# Patient Record
Sex: FEMALE | Race: WHITE | NOT HISPANIC OR LATINO | Employment: OTHER
[De-identification: names, ages, dates, MRNs, and addresses within clinical notes are randomized per-mention and may not be internally consistent; named-entity substitution may affect disease eponyms.]

---

## 2017-05-17 ENCOUNTER — RECORDS - HEALTHEAST (OUTPATIENT)
Dept: ADMINISTRATIVE | Facility: OTHER | Age: 27
End: 2017-05-17

## 2017-05-17 LAB — PAP SMEAR - HIM PATIENT REPORTED: NORMAL

## 2017-06-10 ENCOUNTER — HEALTH MAINTENANCE LETTER (OUTPATIENT)
Age: 27
End: 2017-06-10

## 2018-11-08 ENCOUNTER — TRANSFERRED RECORDS (OUTPATIENT)
Dept: HEALTH INFORMATION MANAGEMENT | Facility: CLINIC | Age: 28
End: 2018-11-08

## 2018-11-08 ENCOUNTER — COMMUNICATION - HEALTHEAST (OUTPATIENT)
Dept: TELEHEALTH | Facility: CLINIC | Age: 28
End: 2018-11-08

## 2018-11-08 ENCOUNTER — OFFICE VISIT - HEALTHEAST (OUTPATIENT)
Dept: FAMILY MEDICINE | Facility: CLINIC | Age: 28
End: 2018-11-08

## 2018-11-08 DIAGNOSIS — F41.9 ANXIETY: ICD-10-CM

## 2018-11-08 DIAGNOSIS — N64.4 BREAST PAIN, LEFT: ICD-10-CM

## 2018-11-08 DIAGNOSIS — R00.2 PALPITATIONS: ICD-10-CM

## 2018-11-08 DIAGNOSIS — N92.6 IRREGULAR MENSES: ICD-10-CM

## 2018-11-08 ASSESSMENT — MIFFLIN-ST. JEOR: SCORE: 1471.91

## 2018-11-14 ENCOUNTER — HOSPITAL ENCOUNTER (OUTPATIENT)
Dept: MAMMOGRAPHY | Facility: CLINIC | Age: 28
Discharge: HOME | End: 2018-11-14
Attending: NURSE PRACTITIONER

## 2018-11-14 ENCOUNTER — HOSPITAL ENCOUNTER (OUTPATIENT)
Dept: ULTRASOUND IMAGING | Facility: CLINIC | Age: 28
Discharge: HOME | End: 2018-11-14
Attending: NURSE PRACTITIONER

## 2018-11-14 DIAGNOSIS — N64.4 BREAST PAIN: ICD-10-CM

## 2018-11-14 DIAGNOSIS — N64.4 BREAST PAIN, LEFT: ICD-10-CM

## 2018-11-15 ENCOUNTER — AMBULATORY - HEALTHEAST (OUTPATIENT)
Dept: LAB | Facility: CLINIC | Age: 28
End: 2018-11-15

## 2018-11-15 ENCOUNTER — COMMUNICATION - HEALTHEAST (OUTPATIENT)
Dept: FAMILY MEDICINE | Facility: CLINIC | Age: 28
End: 2018-11-15

## 2018-11-15 DIAGNOSIS — N92.6 IRREGULAR MENSES: ICD-10-CM

## 2018-11-15 LAB
PROLACTIN SERPL-MCNC: 22.4 NG/ML (ref 0–20)
TSH SERPL DL<=0.005 MIU/L-ACNC: 1.21 UIU/ML (ref 0.3–5)

## 2018-11-19 ENCOUNTER — COMMUNICATION - HEALTHEAST (OUTPATIENT)
Dept: FAMILY MEDICINE | Facility: CLINIC | Age: 28
End: 2018-11-19

## 2018-11-20 ENCOUNTER — AMBULATORY - HEALTHEAST (OUTPATIENT)
Dept: FAMILY MEDICINE | Facility: CLINIC | Age: 28
End: 2018-11-20

## 2018-11-20 DIAGNOSIS — E22.1 HYPERPROLACTINEMIA (H): ICD-10-CM

## 2018-11-23 ENCOUNTER — TRANSFERRED RECORDS (OUTPATIENT)
Dept: HEALTH INFORMATION MANAGEMENT | Facility: CLINIC | Age: 28
End: 2018-11-23

## 2018-11-23 ENCOUNTER — AMBULATORY - HEALTHEAST (OUTPATIENT)
Dept: LAB | Facility: CLINIC | Age: 28
End: 2018-11-23

## 2018-11-23 DIAGNOSIS — E22.1 HYPERPROLACTINEMIA (H): ICD-10-CM

## 2018-11-23 LAB — PROLACTIN SERPL-MCNC: 26 NG/ML (ref 0–20)

## 2018-11-25 ENCOUNTER — COMMUNICATION - HEALTHEAST (OUTPATIENT)
Dept: FAMILY MEDICINE | Facility: CLINIC | Age: 28
End: 2018-11-25

## 2018-11-26 ENCOUNTER — HOSPITAL ENCOUNTER (OUTPATIENT)
Dept: CARDIOLOGY | Facility: CLINIC | Age: 28
Discharge: HOME | End: 2018-11-26
Attending: NURSE PRACTITIONER

## 2018-11-26 ENCOUNTER — AMBULATORY - HEALTHEAST (OUTPATIENT)
Dept: FAMILY MEDICINE | Facility: CLINIC | Age: 28
End: 2018-11-26

## 2018-11-26 DIAGNOSIS — R00.2 PALPITATIONS: ICD-10-CM

## 2018-11-26 LAB
AORTIC ROOT: 3 CM
BSA FOR ECHO PROCEDURE: 1.84 M2
CV BLOOD PRESSURE: ABNORMAL MMHG
CV ECHO HEIGHT: 68.3 IN
CV ECHO WEIGHT: 155 LBS
DOP CALC LVOT AREA: 3.46 CM2
DOP CALC LVOT DIAMETER: 2.1 CM
DOP CALC LVOT PEAK VEL: 111 CM/S
DOP CALC LVOT STROKE VOLUME: 75.1 CM3
DOP CALCLVOT PEAK VEL VTI: 21.7 CM
EJECTION FRACTION: 71 % (ref 55–75)
FRACTIONAL SHORTENING: 33.1 % (ref 28–44)
INTERVENTRICULAR SEPTUM IN END DIASTOLE: 0.8 CM (ref 0.6–0.9)
IVS/PW RATIO: 1
LA AREA 1: 16.5 CM2
LA AREA 2: 16.6 CM2
LEFT ATRIUM LENGTH: 5.28 CM
LEFT ATRIUM SIZE: 3.2 CM
LEFT ATRIUM TO AORTIC ROOT RATIO: 1.07 NO UNITS
LEFT ATRIUM VOLUME INDEX: 24 ML/M2
LEFT ATRIUM VOLUME: 44.1 ML
LEFT VENTRICLE CARDIAC INDEX: 3.1 L/MIN/M2
LEFT VENTRICLE CARDIAC OUTPUT: 5.8 L/MIN
LEFT VENTRICLE DIASTOLIC VOLUME INDEX: 89.1 CM3/M2 (ref 29–61)
LEFT VENTRICLE DIASTOLIC VOLUME: 164 CM3 (ref 46–106)
LEFT VENTRICLE HEART RATE: 77 BPM
LEFT VENTRICLE MASS INDEX: 59.6 G/M2
LEFT VENTRICLE SYSTOLIC VOLUME INDEX: 26.1 CM3/M2 (ref 8–24)
LEFT VENTRICLE SYSTOLIC VOLUME: 48 CM3 (ref 14–42)
LEFT VENTRICULAR INTERNAL DIMENSION IN DIASTOLE: 4.38 CM (ref 3.8–5.2)
LEFT VENTRICULAR INTERNAL DIMENSION IN SYSTOLE: 2.93 CM (ref 2.2–3.5)
LEFT VENTRICULAR MASS: 109.6 G
LEFT VENTRICULAR OUTFLOW TRACT MEAN GRADIENT: 2 MMHG
LEFT VENTRICULAR OUTFLOW TRACT MEAN VELOCITY: 72.7 CM/S
LEFT VENTRICULAR OUTFLOW TRACT PEAK GRADIENT: 5 MMHG
LEFT VENTRICULAR POSTERIOR WALL IN END DIASTOLE: 0.82 CM (ref 0.6–0.9)
LV STROKE VOLUME INDEX: 40.8 ML/M2
MITRAL VALVE E/A RATIO: 2.2
MV AVERAGE E/E' RATIO: 5.7 CM/S
MV DECELERATION TIME: 222 MS
MV E'TISSUE VEL-LAT: 23.6 CM/S
MV E'TISSUE VEL-MED: 11.6 CM/S
MV LATERAL E/E' RATIO: 4.2
MV MEDIAL E/E' RATIO: 8.6
MV PEAK A VELOCITY: 45.9 CM/S
MV PEAK E VELOCITY: 100 CM/S
NUC REST DIASTOLIC VOLUME INDEX: 2484.8 LBS
NUC REST SYSTOLIC VOLUME INDEX: 68.25 IN
PR MAX PG: 3 MMHG
PR PEAK VELOCITY: 80.1 CM/S
TRICUSPID REGURGITATION PEAK PRESSURE GRADIENT: 12.3 MMHG
TRICUSPID VALVE ANULAR PLANE SYSTOLIC EXCURSION: 2.2 CM
TRICUSPID VALVE PEAK REGURGITANT VELOCITY: 175 CM/S

## 2018-11-26 ASSESSMENT — MIFFLIN-ST. JEOR: SCORE: 1471.91

## 2018-11-30 ENCOUNTER — AMBULATORY - HEALTHEAST (OUTPATIENT)
Dept: FAMILY MEDICINE | Facility: CLINIC | Age: 28
End: 2018-11-30

## 2018-11-30 DIAGNOSIS — E22.1 HYPERPROLACTINEMIA (H): ICD-10-CM

## 2018-12-31 ENCOUNTER — OFFICE VISIT - HEALTHEAST (OUTPATIENT)
Dept: FAMILY MEDICINE | Facility: CLINIC | Age: 28
End: 2018-12-31

## 2018-12-31 DIAGNOSIS — R21 RASH: ICD-10-CM

## 2018-12-31 DIAGNOSIS — R11.0 NAUSEA: ICD-10-CM

## 2018-12-31 DIAGNOSIS — R25.1 TREMOR: ICD-10-CM

## 2018-12-31 DIAGNOSIS — F41.9 ANXIETY: ICD-10-CM

## 2018-12-31 DIAGNOSIS — E22.1 HYPERPROLACTINEMIA (H): ICD-10-CM

## 2018-12-31 LAB
ALBUMIN SERPL-MCNC: 4.2 G/DL (ref 3.5–5)
ALP SERPL-CCNC: 62 U/L (ref 45–120)
ALT SERPL W P-5'-P-CCNC: 9 U/L (ref 0–45)
ANION GAP SERPL CALCULATED.3IONS-SCNC: 9 MMOL/L (ref 5–18)
AST SERPL W P-5'-P-CCNC: 14 U/L (ref 0–40)
BILIRUB SERPL-MCNC: 0.6 MG/DL (ref 0–1)
BUN SERPL-MCNC: 11 MG/DL (ref 8–22)
CALCIUM SERPL-MCNC: 9.8 MG/DL (ref 8.5–10.5)
CHLORIDE BLD-SCNC: 104 MMOL/L (ref 98–107)
CO2 SERPL-SCNC: 27 MMOL/L (ref 22–31)
CREAT SERPL-MCNC: 0.73 MG/DL (ref 0.6–1.1)
ERYTHROCYTE [DISTWIDTH] IN BLOOD BY AUTOMATED COUNT: 11.4 % (ref 11–14.5)
GFR SERPL CREATININE-BSD FRML MDRD: >60 ML/MIN/1.73M2
GLUCOSE BLD-MCNC: 86 MG/DL (ref 70–125)
HCT VFR BLD AUTO: 37.8 % (ref 35–47)
HGB BLD-MCNC: 12.9 G/DL (ref 12–16)
MCH RBC QN AUTO: 31.3 PG (ref 27–34)
MCHC RBC AUTO-ENTMCNC: 34 G/DL (ref 32–36)
MCV RBC AUTO: 92 FL (ref 80–100)
PLATELET # BLD AUTO: 221 THOU/UL (ref 140–440)
PMV BLD AUTO: 9.6 FL (ref 7–10)
POTASSIUM BLD-SCNC: 4.2 MMOL/L (ref 3.5–5)
PROT SERPL-MCNC: 7.6 G/DL (ref 6–8)
RBC # BLD AUTO: 4.1 MILL/UL (ref 3.8–5.4)
SODIUM SERPL-SCNC: 140 MMOL/L (ref 136–145)
T4 FREE SERPL-MCNC: 1.1 NG/DL (ref 0.7–1.8)
TSH SERPL DL<=0.005 MIU/L-ACNC: 1.43 UIU/ML (ref 0.3–5)
WBC: 6 THOU/UL (ref 4–11)

## 2018-12-31 ASSESSMENT — MIFFLIN-ST. JEOR: SCORE: 1478.71

## 2019-01-01 LAB — THYROID PEROXIDASE ANTIBODIES - HISTORICAL: <3 IU/ML (ref 0–5.6)

## 2019-01-02 LAB — B BURGDOR IGG+IGM SER QL: 0.07 INDEX VALUE

## 2019-01-03 LAB
ANA SER QL: 2.4 U
CERULOPLASMIN SERPL-MCNC: 33 MG/DL (ref 23–53)
GLIADIN IGA SER-ACNC: 1.6 U/ML
GLIADIN IGG SER-ACNC: 5.2 U/ML
IGA SERPL-MCNC: 140 MG/DL (ref 65–400)
TTG IGA SER-ACNC: 0.1 U/ML
TTG IGG SER-ACNC: <0.6 U/ML

## 2019-01-04 LAB
ARSENIC, WHOLE BLOOD: <10 NG/ML
LAB SAMPLE TYPE: NORMAL
LAB STATE REPORTED TO: NORMAL
LEAD, WHOLE BLOOD - HISTORICAL: <1 UG/DL
MERCURY, WHOLE BLOOD - HISTORICAL: <5 NG/ML
SPECIMEN STATUS: NORMAL

## 2019-01-07 ENCOUNTER — AMBULATORY - HEALTHEAST (OUTPATIENT)
Dept: FAMILY MEDICINE | Facility: CLINIC | Age: 29
End: 2019-01-07

## 2019-01-07 DIAGNOSIS — R76.8 ELEVATED ANTINUCLEAR ANTIBODY (ANA) LEVEL: ICD-10-CM

## 2019-01-07 DIAGNOSIS — R25.1 TREMOR: ICD-10-CM

## 2019-01-10 ENCOUNTER — OFFICE VISIT - HEALTHEAST (OUTPATIENT)
Dept: RHEUMATOLOGY | Facility: CLINIC | Age: 29
End: 2019-01-10

## 2019-01-10 DIAGNOSIS — R11.0 NAUSEA: ICD-10-CM

## 2019-01-10 DIAGNOSIS — R42 DIZZINESS: ICD-10-CM

## 2019-01-10 ASSESSMENT — MIFFLIN-ST. JEOR: SCORE: 1471.11

## 2019-01-21 ENCOUNTER — OFFICE VISIT (OUTPATIENT)
Dept: ENDOCRINOLOGY | Facility: CLINIC | Age: 29
End: 2019-01-21
Payer: COMMERCIAL

## 2019-01-21 VITALS
HEIGHT: 69 IN | SYSTOLIC BLOOD PRESSURE: 110 MMHG | DIASTOLIC BLOOD PRESSURE: 86 MMHG | OXYGEN SATURATION: 98 % | HEART RATE: 70 BPM | WEIGHT: 153.4 LBS | BODY MASS INDEX: 22.72 KG/M2 | TEMPERATURE: 97.6 F

## 2019-01-21 DIAGNOSIS — E22.1 HYPERPROLACTINEMIA (H): ICD-10-CM

## 2019-01-21 LAB
FSH SERPL-ACNC: 4.8 IU/L
LH SERPL-ACNC: 5 IU/L
PROLACTIN SERPL-MCNC: 7 UG/L (ref 3–27)

## 2019-01-21 PROCEDURE — 81025 URINE PREGNANCY TEST: CPT | Performed by: INTERNAL MEDICINE

## 2019-01-21 PROCEDURE — 36415 COLL VENOUS BLD VENIPUNCTURE: CPT | Performed by: INTERNAL MEDICINE

## 2019-01-21 PROCEDURE — 84146 ASSAY OF PROLACTIN: CPT | Performed by: INTERNAL MEDICINE

## 2019-01-21 PROCEDURE — 99204 OFFICE O/P NEW MOD 45 MIN: CPT | Performed by: INTERNAL MEDICINE

## 2019-01-21 PROCEDURE — 83002 ASSAY OF GONADOTROPIN (LH): CPT | Performed by: INTERNAL MEDICINE

## 2019-01-21 PROCEDURE — 83001 ASSAY OF GONADOTROPIN (FSH): CPT | Performed by: INTERNAL MEDICINE

## 2019-01-21 PROCEDURE — 84443 ASSAY THYROID STIM HORMONE: CPT | Performed by: INTERNAL MEDICINE

## 2019-01-21 RX ORDER — MULTIVIT WITH MINERALS/LUTEIN
250 TABLET ORAL
COMMUNITY
End: 2021-08-26

## 2019-01-21 ASSESSMENT — MIFFLIN-ST. JEOR: SCORE: 1482.26

## 2019-01-21 NOTE — NURSING NOTE
"ENDOCRINOLOGY INTAKE FORM    Patient Name:  Nguyen Jacob  :  1990    Is patient Diabetic?   No  Does patient have non-diabetic or other endocrine issues?  Yes: hyperprolactinemia    Vitals: /86 (BP Location: Right arm, Patient Position: Chair, Cuff Size: Adult Regular)   Pulse 70   Temp 97.6  F (36.4  C) (Oral)   Ht 1.74 m (5' 8.5\")   Wt 69.6 kg (153 lb 6.4 oz)   SpO2 98%   Breastfeeding? No   BMI 22.99 kg/m    BMI= Body mass index is 22.99 kg/m .    Flu vaccine:  No  Pneumonia vaccine:  No    Smoking and Alcohol use:  Social History     Tobacco Use     Smoking status: Never Smoker     Smokeless tobacco: Never Used   Substance Use Topics     Alcohol use: No     Drug use: No       Stephanie Ho CMA    "

## 2019-01-21 NOTE — PATIENT INSTRUCTIONS
[facility]   Dr Wei, Endocrinology Department      [facility]   [address]  [place] [address]  Appointment [phone]  Fax: [phone]  [place]: Monday and Tuesday         [place] [address]  [place] [address]  Appointment [phone]  Fax: [phone]  [place]: Wednesday and Thursday            Labs today  furhter work up based on labs

## 2019-01-21 NOTE — LETTER
1/21/2019         RE: Nguyen Jacob  [address] [place] [id number]        Dear Colleague,    Thank you for referring your patient, Nguyen Jacob, to the [facility]. Please see a copy of my visit note below.    Name: Nguyen Jacob  Seen in consultation with Rochelle Farias for elevated prolactin.  HPI:  Nguyen Jacob is a 28 year old female who presents for the evaluation of hyperprolactinemia.  Was having work up for infertility  Was on OC pills for 4 years. Went off OC pills in may 2017.  Trying to get pregnant.  Was seen by OBGYN for infertility work up in past.  Was also seen by Rehumatology earlier for elevated JB.    H/o anxiety and depression.  Was Also on sertraline. And was then tapered off.  Now off sertraline X 2 weeks.    Feeling OK.    Prolactin was ordered as infertility work up and was noted to be slightly high at 23 in 11/2018.  ( was on sertaline at that time)    Breast discharge: no.   Menses: now regular X few months. Previously irregular.  Changes in periods: no  Pregnancy desire: yes. Actively trying  Changes in vision: a little. Has not seen eye doctor. No problems with peripheral vision  Headaches: No  Medications: as above    PMH/PSH:  Past Medical History:   Diagnosis Date     Acne      NO ACTIVE PROBLEMS      Past Surgical History:   Procedure Laterality Date     NO HISTORY OF SURGERY       Family Hx:  Family History   Problem Relation Age of Onset     Hypertension Father      Family History Negative Mother      Diabetes Other         paternal cousin       Social Hx:  Social History     Socioeconomic History     Marital status:      Spouse name: Not on file     Number of children: Not on file     Years of education: Not on file     Highest education level: Not on file   Social Needs     Financial resource strain: Not on file     Food insecurity - worry: Not on file     Food insecurity - inability: Not on file     Transportation needs - medical: Not on file      "Transportation needs - non-medical: Not on file   Occupational History     Not on file   Tobacco Use     Smoking status: Never Smoker     Smokeless tobacco: Never Used   Substance and Sexual Activity     Alcohol use: No     Drug use: No     Sexual activity: No   Other Topics Concern     Parent/sibling w/ CABG, MI or angioplasty before 65F 55M? No   Social History Narrative     Not on file          MEDICATIONS:  has a current medication list which includes the following prescription(s): cholecalciferol, multiple vitamins-minerals, and vitamin c.    ROS     ROS: 10 point ROS neg other than the symptoms noted above in the HPI.    Physical Exam   VS: /86 (BP Location: Right arm, Patient Position: Chair, Cuff Size: Adult Regular)   Pulse 70   Temp 97.6  F (36.4  C) (Oral)   Ht 1.74 m (5' 8.5\")   Wt 69.6 kg (153 lb 6.4 oz)   SpO2 98%   Breastfeeding? No   BMI 22.99 kg/m     GENERAL: AXOX3, NAD, well dressed, answering questions appropriately, appears stated age.  HEENT: OP clear, no LAD, no TM, non-tender, no exopthalmous, no proptosis, EOMI, no lig lag, no retraction  Breast: no nipple discharge  CV: RRR, no rubs, gallops, no murmurs  LUNGS: CTAB, no wheezes, rales, or ronchi  ABDOMEN: +BS  EXTREMITIES: no edema, +pulses, no rashes, no lesions  NEUROLOGY: CN grossly intact, + DTR upper and lower extremity, no tremors  MSK: grossly intact  SKIN: no rashes, no lesions    LABS:  proactin 11/2018 ([facility]):  26.0 (H) 22.4 (H)       All pertinent notes, labs, and images personally reviewed by me.   Available records, labs and images from outside clinic were personally reviewed.    A/P  Ms.Krista Jacob is a 28 year old here for the evaluation of elevated prolactin:    Hyperprolactinemia:  Mildly elevated prolactin was noted in November 2018.  She was on sertraline at that time  Has ongoing problem with infertility though actively not trying to get pregnant.  Periods are now regular  No breast discharge  She " is off of sertraline for last 2 weeks  No peripheral vision problems or headaches  Plan  Repeat labs and screen for other pituitary hormones as noted below  Based on that consider MRI  Further workup based on labs.    Plan: Prolactin, TSH with free T4 reflex, Follicle         stimulating hormone, Lutropin, HCG Qual, Urine-        [facility] ([id number])         Prolactin secreting pituitary tumors are the most common type of hormone secreting pituitary tumors.     Causes: Hypothyroidism can be associated with hyperprolactinemia. Hypothyroidism results in increased TRH production, then TRH (which can act as a PRF) could lead to hyperprolactinemia. Estrogens act directly at the pituitary level, causing stimulation of lactotrophs, and thus enhance prolactin secretion. Injury to the chest wall can also lead to hyperprolactinemia; this results from abnormal stimulation of the reflex associated with the rise in prolactin seen in suckling. Certain drugs act as dopamine-receptor-blocking agents, including phenothiazines (e.g. chlorpromazine), butyrophenones (haloperidol), and benzamides (metoclopramide, sulpiride, and domperidone). These drugs block the effects of endogenous dopamine and thus release lactotrophs from their hypothalamic inhibition. Pituitary or stalk tumors with abnormal blood supplies, or their pressure effects, may interfere with the circulatory pathway from the hypothalamus down the pituitary stalk to the normal lactotrophs or a tumor, producing effective dopamine deficiency due to a functional stalk section. Diseases of the hypothalamus, such as tumors, arterio-venous malformations, and inflammatory processes such as sarcoidosis result in either diminished synthesis or release of dopamine. Certain drugs (e.g. alpha-methyldopa and reserpine) are capable of depleting the central dopamine stores.     The symptoms associated with hyperprolactinemia may be due to several factors: the direct effects of excess  prolactin à galactorrhea or hypogonadism; the effects of the structural lesion causing the disorder (i.e. the pituitary tumor) à  headaches, visual field defects, or external ophthalmoplegia; or associated dysfunction of secretion of other anterior pituitary hormones.     Women with hyperprolactinemia usually present with menstrual abnormalities - amenorrhea or oligomenorrhea - or regular cycles with infertility or menorrhagia. Men often present late in the course of the disease with symptoms of expansion of their pituitary tumor (i.e. headaches, visual defects, and external ophthalmoplegia) or symptoms from secondary adrenal or thyroid failure.     A microadenoma has a maximum diameter of up to 10mm, and a macroadenoma as having a diameter > 10mm. A microadenoma has serum prolactin levels below 200ng/mL.  A macroadenoma that secretes prolactin is usually associated with a serum prolactin level of more than 200ng/mL.    When serum prolactin is evaluated by two-site immunometric assays, large amounts of prolactin saturate both the capture and the signal antibodies, impairing their binding, causing serum prolactin to be underestimated (hook effect).   In order to avoid unnecessary surgery (treatment of choice for nonfunctioning tumors), prolactin assays with serum dilution are recommended in patients with macroadenomas who may harbor a prolactinoma.    The anatomy of the pituitary is optimally assessed by MRI. MRI allows imaging of the optic chiasm, the cavernous sinuses, the pituitary (both the normal gland and tumors), and its stalk. MRI allows accurate measurement of the size of the pituitary and of any tumor and its relationship to the optic chiasm and cavernous sinuses.     Cabergoline has an extremely long biological half life and thus only needs to be administered either once or twice per week at a dose of 0.5 mg/dose.  Cabergoline is generally better tolerated than bromocriptine, so increasing the patient's  adherence. Therefore, cabergoline is currently considered the first choice drug for the treatment of prolactinomas, except for patients wishing pregnancy in the short-term. Bromocriptine reduces pituitary tumor size in 75-80% of patients with large prolactin-secreting tumors, even with gross extrasellar extension.    More than 50% of the time spent with Ms. Jacob on counseling / coordinating her care.      Follow-up:  Based on labs    Frances Wei MD  Endocrinology   [facility]  CC: Lisa Woodard        Addendum to above note and clinic visit:    Labs reviewed.    See result note/telephone encounter.            Again, thank you for allowing me to participate in the care of your patient.        Sincerely,        Frances Wei MD

## 2019-01-21 NOTE — PROGRESS NOTES
Name: Nguyen Jacob  Seen in consultation with Rochelle Farias for elevated prolactin.  HPI:  Nguyen Jacob is a 28 year old female who presents for the evaluation of hyperprolactinemia.  Was having work up for infertility  Was on OC pills for 4 years. Went off OC pills in may 2017.  Trying to get pregnant.  Was seen by OBGYN for infertility work up in past.  Was also seen by Rehumatology earlier for elevated JB.    H/o anxiety and depression.  Was Also on sertraline. And was then tapered off.  Now off sertraline X 2 weeks.    Feeling OK.    Prolactin was ordered as infertility work up and was noted to be slightly high at 23 in 11/2018.  ( was on sertaline at that time)    Breast discharge: no.   Menses: now regular X few months. Previously irregular.  Changes in periods: no  Pregnancy desire: yes. Actively trying  Changes in vision: a little. Has not seen eye doctor. No problems with peripheral vision  Headaches: No  Medications: as above    PMH/PSH:  Past Medical History:   Diagnosis Date     Acne      NO ACTIVE PROBLEMS      Past Surgical History:   Procedure Laterality Date     NO HISTORY OF SURGERY       Family Hx:  Family History   Problem Relation Age of Onset     Hypertension Father      Family History Negative Mother      Diabetes Other         paternal cousin       Social Hx:  Social History     Socioeconomic History     Marital status:      Spouse name: Not on file     Number of children: Not on file     Years of education: Not on file     Highest education level: Not on file   Social Needs     Financial resource strain: Not on file     Food insecurity - worry: Not on file     Food insecurity - inability: Not on file     Transportation needs - medical: Not on file     Transportation needs - non-medical: Not on file   Occupational History     Not on file   Tobacco Use     Smoking status: Never Smoker     Smokeless tobacco: Never Used   Substance and Sexual Activity     Alcohol use: No     Drug use: No  "    Sexual activity: No   Other Topics Concern     Parent/sibling w/ CABG, MI or angioplasty before 65F 55M? No   Social History Narrative     Not on file          MEDICATIONS:  has a current medication list which includes the following prescription(s): cholecalciferol, multiple vitamins-minerals, and vitamin c.    ROS     ROS: 10 point ROS neg other than the symptoms noted above in the HPI.    Physical Exam   VS: /86 (BP Location: Right arm, Patient Position: Chair, Cuff Size: Adult Regular)   Pulse 70   Temp 97.6  F (36.4  C) (Oral)   Ht 1.74 m (5' 8.5\")   Wt 69.6 kg (153 lb 6.4 oz)   SpO2 98%   Breastfeeding? No   BMI 22.99 kg/m    GENERAL: AXOX3, NAD, well dressed, answering questions appropriately, appears stated age.  HEENT: OP clear, no LAD, no TM, non-tender, no exopthalmous, no proptosis, EOMI, no lig lag, no retraction  Breast: no nipple discharge  CV: RRR, no rubs, gallops, no murmurs  LUNGS: CTAB, no wheezes, rales, or ronchi  ABDOMEN: +BS  EXTREMITIES: no edema, +pulses, no rashes, no lesions  NEUROLOGY: CN grossly intact, + DTR upper and lower extremity, no tremors  MSK: grossly intact  SKIN: no rashes, no lesions    LABS:  proactin 11/2018 ([facility]):  26.0 (H) 22.4 (H)       All pertinent notes, labs, and images personally reviewed by me.   Available records, labs and images from outside clinic were personally reviewed.    A/P  Ms.Krista Jacob is a 28 year old here for the evaluation of elevated prolactin:    Hyperprolactinemia:  Mildly elevated prolactin was noted in November 2018.  She was on sertraline at that time  Has ongoing problem with infertility though actively not trying to get pregnant.  Periods are now regular  No breast discharge  She is off of sertraline for last 2 weeks  No peripheral vision problems or headaches  Plan  Repeat labs and screen for other pituitary hormones as noted below  Based on that consider MRI  Further workup based on labs.    Plan: Prolactin, TSH " with free T4 reflex, Follicle         stimulating hormone, Lutropin, HCG Qual, Urine-        [facility] ([id number])         Prolactin secreting pituitary tumors are the most common type of hormone secreting pituitary tumors.     Causes: Hypothyroidism can be associated with hyperprolactinemia. Hypothyroidism results in increased TRH production, then TRH (which can act as a PRF) could lead to hyperprolactinemia. Estrogens act directly at the pituitary level, causing stimulation of lactotrophs, and thus enhance prolactin secretion. Injury to the chest wall can also lead to hyperprolactinemia; this results from abnormal stimulation of the reflex associated with the rise in prolactin seen in suckling. Certain drugs act as dopamine-receptor-blocking agents, including phenothiazines (e.g. chlorpromazine), butyrophenones (haloperidol), and benzamides (metoclopramide, sulpiride, and domperidone). These drugs block the effects of endogenous dopamine and thus release lactotrophs from their hypothalamic inhibition. Pituitary or stalk tumors with abnormal blood supplies, or their pressure effects, may interfere with the circulatory pathway from the hypothalamus down the pituitary stalk to the normal lactotrophs or a tumor, producing effective dopamine deficiency due to a functional stalk section. Diseases of the hypothalamus, such as tumors, arterio-venous malformations, and inflammatory processes such as sarcoidosis result in either diminished synthesis or release of dopamine. Certain drugs (e.g. alpha-methyldopa and reserpine) are capable of depleting the central dopamine stores.     The symptoms associated with hyperprolactinemia may be due to several factors: the direct effects of excess prolactin à galactorrhea or hypogonadism; the effects of the structural lesion causing the disorder (i.e. the pituitary tumor) à  headaches, visual field defects, or external ophthalmoplegia; or associated dysfunction of secretion of other  anterior pituitary hormones.     Women with hyperprolactinemia usually present with menstrual abnormalities - amenorrhea or oligomenorrhea - or regular cycles with infertility or menorrhagia. Men often present late in the course of the disease with symptoms of expansion of their pituitary tumor (i.e. headaches, visual defects, and external ophthalmoplegia) or symptoms from secondary adrenal or thyroid failure.     A microadenoma has a maximum diameter of up to 10mm, and a macroadenoma as having a diameter > 10mm. A microadenoma has serum prolactin levels below 200ng/mL.  A macroadenoma that secretes prolactin is usually associated with a serum prolactin level of more than 200ng/mL.    When serum prolactin is evaluated by two-site immunometric assays, large amounts of prolactin saturate both the capture and the signal antibodies, impairing their binding, causing serum prolactin to be underestimated (hook effect).   In order to avoid unnecessary surgery (treatment of choice for nonfunctioning tumors), prolactin assays with serum dilution are recommended in patients with macroadenomas who may harbor a prolactinoma.    The anatomy of the pituitary is optimally assessed by MRI. MRI allows imaging of the optic chiasm, the cavernous sinuses, the pituitary (both the normal gland and tumors), and its stalk. MRI allows accurate measurement of the size of the pituitary and of any tumor and its relationship to the optic chiasm and cavernous sinuses.     Cabergoline has an extremely long biological half life and thus only needs to be administered either once or twice per week at a dose of 0.5 mg/dose.  Cabergoline is generally better tolerated than bromocriptine, so increasing the patient's adherence. Therefore, cabergoline is currently considered the first choice drug for the treatment of prolactinomas, except for patients wishing pregnancy in the short-term. Bromocriptine reduces pituitary tumor size in 75-80% of patients with  large prolactin-secreting tumors, even with gross extrasellar extension.    More than 50% of the time spent with Ms. Jacob on counseling / coordinating her care.      Follow-up:  Based on labs    Frances Wei MD  Endocrinology   [facility]  CC: Lisa Woodard        Addendum to above note and clinic visit:    Labs reviewed.    See result note/telephone encounter.

## 2019-01-22 LAB — TSH SERPL DL<=0.005 MIU/L-ACNC: 1.12 MU/L (ref 0.4–4)

## 2019-05-06 ENCOUNTER — OFFICE VISIT (OUTPATIENT)
Dept: URGENT CARE | Facility: URGENT CARE | Age: 29
End: 2019-05-06
Payer: COMMERCIAL

## 2019-05-06 ENCOUNTER — COMMUNICATION - HEALTHEAST (OUTPATIENT)
Dept: FAMILY MEDICINE | Facility: CLINIC | Age: 29
End: 2019-05-06

## 2019-05-06 VITALS — TEMPERATURE: 97.2 F | OXYGEN SATURATION: 99 % | HEART RATE: 74 BPM

## 2019-05-06 DIAGNOSIS — R11.0 NAUSEA: Primary | ICD-10-CM

## 2019-05-06 DIAGNOSIS — M54.2 NECK PAIN: ICD-10-CM

## 2019-05-06 DIAGNOSIS — R29.898 NECK TIGHTNESS: ICD-10-CM

## 2019-05-06 DIAGNOSIS — R51.9 FRONTAL HEADACHE: ICD-10-CM

## 2019-05-06 PROCEDURE — 99203 OFFICE O/P NEW LOW 30 MIN: CPT | Performed by: FAMILY MEDICINE

## 2019-05-06 RX ORDER — OMEGA-3 FATTY ACIDS/FISH OIL 300-1000MG
200 CAPSULE ORAL EVERY 4 HOURS PRN
COMMUNITY

## 2019-05-06 RX ORDER — ONDANSETRON 4 MG/1
4 TABLET, ORALLY DISINTEGRATING ORAL ONCE
Status: COMPLETED | OUTPATIENT
Start: 2019-05-06 | End: 2019-05-06

## 2019-05-06 RX ORDER — CYCLOBENZAPRINE HCL 10 MG
10 TABLET ORAL 3 TIMES DAILY PRN
Qty: 20 TABLET | Refills: 0 | Status: SHIPPED | OUTPATIENT
Start: 2019-05-06 | End: 2021-08-26

## 2019-05-06 RX ORDER — ONDANSETRON 4 MG/1
4 TABLET, ORALLY DISINTEGRATING ORAL EVERY 8 HOURS PRN
Qty: 12 TABLET | Refills: 0 | Status: SHIPPED | OUTPATIENT
Start: 2019-05-06 | End: 2023-04-13

## 2019-05-06 RX ADMIN — ONDANSETRON 4 MG: 4 TABLET, ORALLY DISINTEGRATING ORAL at 18:53

## 2019-05-06 NOTE — PROGRESS NOTES
Subjective:   Nguyen Jacob is a 29 year old female who presents for   Chief Complaint   Patient presents with     Urgent Care     Neck Pain     severe neck pain x1week,nausea,feeling motion sickness,started new job.     States she Is 3 weeks into her new job - has been sedentary more than usual. 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 Reports hx of migraine headaches that started many years ago that usually presents neck muscle strain.   Patient is nauseous at this time and would like an anti-emetic. In past flexeril has been helpful - she is concerned about using it while at work. She is currently out of flexeril and hasn't been using for the last couple of years. Has been doing massages to prevent discomfort.   She was sick/nauseous dizzy a week ago at her clinic. Frontal headache. Again, no trauma or recent injuries to the neck.   Has not seen a chiropractor recently.   Works as a triage nurse for [employer]    Has an appt with primary for next Monday.       Patient Active Problem List    Diagnosis Date Noted     Hyperprolactinemia (H) 01/21/2019     Priority: Medium     Lumbago 01/05/2013     Priority: Medium     Pain in thoracic spine 01/05/2013     Priority: Medium     Adjustment disorder with anxiety 12/31/2012     Priority: Medium     Migraine 12/31/2012     Priority: Medium     CARDIOVASCULAR SCREENING; LDL GOAL LESS THAN 160 10/31/2010     Priority: Medium       Current Outpatient Medications   Medication     Acetaminophen 325 MG CAPS     cholecalciferol (VITAMIN D3) 5000 units TABS tablet     cyclobenzaprine (FLEXERIL) 10 MG tablet     ibuprofen (ADVIL/MOTRIN) 200 MG capsule     Multiple Vitamins-Minerals (DAILY MULTIVITAMIN PO)     ondansetron (ZOFRAN-ODT) 4 MG ODT tab     vitamin C (ASCORBIC ACID) 250 MG tablet     No current facility-administered medications for this visit.      ROS:  As above per HPI    Objective:   Pulse 74   Temp 97.2  F (36.2  C) (Oral)   SpO2 99% ,  There is no height or weight on file to calculate BMI.  Gen:  NAD, well-nourished, sitting in chair comfortably  HEENT: EOMI, sclera anicteric, Head normocephalic, ; nares patent; moist mucous membranes  Neck: trachea midline, no thyromegaly, no C-spine tenderness  CV:  Hemodynamically stable  Pulm:  no increased work of breathing , CTAB, no wheezes/rales/rhonchi   Neuro: CN II-XII intact, no eye nystagmus, no slurred speech  Extrem: no cyanosis, edema or clubbing  Skin: no obvious rashes or abnormalities  Psych: Euthymic, linear thoughts, normal rate of speech    MR Cervical spine 2015:   Findings:  Normal vertebral body facet alignment.  No fracture or vertebral body loss of height.  No spondylolisthesis.  No ligamentous injury.  Normal marrow signal.  Normal cord signal.  The visualized brainstem and cerebellum appear normal.  C2-3:  No spinal canal or neural foraminal narrowing.  C3-4:  No spinal canal or neural foraminal narrowing.  C4-5:  No spinal canal or neural foraminal narrowing.  C5-6:  No spinal canal or neural foraminal narrowing.  C6-7:  No spinal canal or neural foraminal narrowing.  C7-T1:  No spinal canal or neural foraminal narrowing.  No spinal canal or neural foraminal narrowing in the visualized upper thoracic spine.    Impression:   1. Normal alignment.  No fracture or spondylolisthesis.   2. Normal cord signal.   3. No spinal canal or neural foraminal narrowing at all levels.    Dictated by Jackson Andrews MD @ Jan 19 2015  2:59PM    Assessment & Plan:   Nguyen Jacob, 29 year old female who presents with:    Nausea  Neck tightness  Frontal headache  Patient given dose of zofran, this seemed to help to a degree. She has features of neck discomfort that is bilateral at the base of neck causing tension headache vs migraine headache. Offerred imitrex but she declined. She was open to taking flexeril.  Normal vital signs and exam suggest against meningitis. Doubt cervical spine fracture as there have  been no reported trauma/accidents. No neck manipulation performed and symptoms not consistent with vertebral artery dissection. Declined toradol as she already took ibuprofen.   - cyclobenzaprine (FLEXERIL) 10 MG tablet  Dispense: 20 tablet; Refill: 0  - Zofran ODT 4-8mg     >25 minutes was spent with the patient face to face of which more than 50% of this time was spent discussing diagnosis, treatment options, and performing counseling.       Sergio Alonso MD   [place] [facility]    The use of Dragon/Buzzient dictation services may have been used to construct the content in this note; any grammatical or spelling errors are non-intentional. Please contact the author of this note directly if you are in need of any clarification.

## 2019-05-07 NOTE — PATIENT INSTRUCTIONS
Flexeril 5-10mg every 8 hours for neck muscle tightness    Take excedrin for headache    Go to emergency room if having worsening headache or neck discomfort

## 2019-05-13 ENCOUNTER — OFFICE VISIT - HEALTHEAST (OUTPATIENT)
Dept: FAMILY MEDICINE | Facility: CLINIC | Age: 29
End: 2019-05-13

## 2019-05-13 DIAGNOSIS — R11.0 NAUSEA: ICD-10-CM

## 2019-05-13 DIAGNOSIS — E04.1 THYROID NODULE: ICD-10-CM

## 2019-05-13 DIAGNOSIS — M54.2 NECK PAIN: ICD-10-CM

## 2019-05-13 ASSESSMENT — MIFFLIN-ST. JEOR: SCORE: 1502.41

## 2019-05-15 ENCOUNTER — COMMUNICATION - HEALTHEAST (OUTPATIENT)
Dept: SCHEDULING | Facility: CLINIC | Age: 29
End: 2019-05-15

## 2019-06-06 ENCOUNTER — HOSPITAL ENCOUNTER (OUTPATIENT)
Dept: PHYSICAL MEDICINE AND REHAB | Facility: CLINIC | Age: 29
Discharge: HOME | End: 2019-06-06
Attending: NURSE PRACTITIONER

## 2019-06-06 ENCOUNTER — HOSPITAL ENCOUNTER (OUTPATIENT)
Dept: ULTRASOUND IMAGING | Facility: CLINIC | Age: 29
Discharge: HOME | End: 2019-06-06
Attending: NURSE PRACTITIONER

## 2019-06-06 DIAGNOSIS — M40.04 POSTURAL KYPHOSIS OF THORACIC REGION: ICD-10-CM

## 2019-06-06 DIAGNOSIS — R51.9 NONINTRACTABLE EPISODIC HEADACHE, UNSPECIFIED HEADACHE TYPE: ICD-10-CM

## 2019-06-06 DIAGNOSIS — R42 DIZZINESS: ICD-10-CM

## 2019-06-06 DIAGNOSIS — M54.2 CERVICALGIA: ICD-10-CM

## 2019-06-06 DIAGNOSIS — E04.1 THYROID NODULE: ICD-10-CM

## 2019-06-06 DIAGNOSIS — M79.18 MYOFASCIAL PAIN: ICD-10-CM

## 2019-06-06 ASSESSMENT — MIFFLIN-ST. JEOR: SCORE: 1480.18

## 2019-06-07 ENCOUNTER — COMMUNICATION - HEALTHEAST (OUTPATIENT)
Dept: SCHEDULING | Facility: CLINIC | Age: 29
End: 2019-06-07

## 2019-06-13 ENCOUNTER — OFFICE VISIT - HEALTHEAST (OUTPATIENT)
Dept: FAMILY MEDICINE | Facility: CLINIC | Age: 29
End: 2019-06-13

## 2019-06-13 ENCOUNTER — HOSPITAL ENCOUNTER (OUTPATIENT)
Dept: RADIOLOGY | Facility: CLINIC | Age: 29
Discharge: HOME | End: 2019-06-13
Attending: PHYSICAL MEDICINE & REHABILITATION

## 2019-06-13 ENCOUNTER — HOSPITAL ENCOUNTER (OUTPATIENT)
Dept: MRI IMAGING | Facility: CLINIC | Age: 29
Discharge: HOME | End: 2019-06-13
Attending: PHYSICAL MEDICINE & REHABILITATION

## 2019-06-13 DIAGNOSIS — R51.9 NONINTRACTABLE EPISODIC HEADACHE, UNSPECIFIED HEADACHE TYPE: ICD-10-CM

## 2019-06-13 DIAGNOSIS — R03.0 ELEVATED BLOOD PRESSURE READING: ICD-10-CM

## 2019-06-13 DIAGNOSIS — M54.2 CERVICALGIA: ICD-10-CM

## 2019-06-13 DIAGNOSIS — R42 DIZZINESS: ICD-10-CM

## 2019-06-13 DIAGNOSIS — M54.2 NECK PAIN: ICD-10-CM

## 2019-06-13 LAB
ALBUMIN UR-MCNC: NEGATIVE MG/DL
ANION GAP SERPL CALCULATED.3IONS-SCNC: 8 MMOL/L (ref 5–18)
APPEARANCE UR: CLEAR
BILIRUB UR QL STRIP: NEGATIVE
BUN SERPL-MCNC: 11 MG/DL (ref 8–22)
CALCIUM SERPL-MCNC: 10.1 MG/DL (ref 8.5–10.5)
CHLORIDE BLD-SCNC: 104 MMOL/L (ref 98–107)
CO2 SERPL-SCNC: 28 MMOL/L (ref 22–31)
COLOR UR AUTO: YELLOW
CREAT SERPL-MCNC: 0.68 MG/DL (ref 0.6–1.1)
GFR SERPL CREATININE-BSD FRML MDRD: >60 ML/MIN/1.73M2
GLUCOSE BLD-MCNC: 81 MG/DL (ref 70–125)
GLUCOSE UR STRIP-MCNC: NEGATIVE MG/DL
HGB UR QL STRIP: NEGATIVE
KETONES UR STRIP-MCNC: NEGATIVE MG/DL
LEUKOCYTE ESTERASE UR QL STRIP: NEGATIVE
NITRATE UR QL: NEGATIVE
PH UR STRIP: 7.5 [PH] (ref 5–8)
POTASSIUM BLD-SCNC: 4.2 MMOL/L (ref 3.5–5)
SODIUM SERPL-SCNC: 140 MMOL/L (ref 136–145)
SP GR UR STRIP: 1.01 (ref 1–1.03)
TSH SERPL DL<=0.005 MIU/L-ACNC: 0.61 UIU/ML (ref 0.3–5)
UROBILINOGEN UR STRIP-ACNC: NORMAL

## 2019-06-13 ASSESSMENT — MIFFLIN-ST. JEOR: SCORE: 1478.82

## 2019-06-14 ENCOUNTER — COMMUNICATION - HEALTHEAST (OUTPATIENT)
Dept: FAMILY MEDICINE | Facility: CLINIC | Age: 29
End: 2019-06-14

## 2019-06-17 ENCOUNTER — AMBULATORY - HEALTHEAST (OUTPATIENT)
Dept: PHYSICAL MEDICINE AND REHAB | Facility: CLINIC | Age: 29
End: 2019-06-17

## 2019-06-17 ENCOUNTER — COMMUNICATION - HEALTHEAST (OUTPATIENT)
Dept: PHYSICAL MEDICINE AND REHAB | Facility: CLINIC | Age: 29
End: 2019-06-17

## 2019-06-17 DIAGNOSIS — M54.2 CERVICALGIA: ICD-10-CM

## 2019-06-20 ENCOUNTER — RECORDS - HEALTHEAST (OUTPATIENT)
Dept: RADIOLOGY | Facility: CLINIC | Age: 29
End: 2019-06-20

## 2019-08-15 ENCOUNTER — COMMUNICATION - HEALTHEAST (OUTPATIENT)
Dept: FAMILY MEDICINE | Facility: CLINIC | Age: 29
End: 2019-08-15

## 2019-09-30 ENCOUNTER — HEALTH MAINTENANCE LETTER (OUTPATIENT)
Age: 29
End: 2019-09-30

## 2019-10-03 ENCOUNTER — OFFICE VISIT - HEALTHEAST (OUTPATIENT)
Dept: FAMILY MEDICINE | Facility: CLINIC | Age: 29
End: 2019-10-03

## 2019-10-03 DIAGNOSIS — R42 DIZZINESS: ICD-10-CM

## 2019-10-03 DIAGNOSIS — R11.0 NAUSEA: ICD-10-CM

## 2019-10-03 LAB
ANION GAP SERPL CALCULATED.3IONS-SCNC: 7 MMOL/L (ref 5–18)
BUN SERPL-MCNC: 11 MG/DL (ref 8–22)
CALCIUM SERPL-MCNC: 9.5 MG/DL (ref 8.5–10.5)
CHLORIDE BLD-SCNC: 106 MMOL/L (ref 98–107)
CO2 SERPL-SCNC: 28 MMOL/L (ref 22–31)
CREAT SERPL-MCNC: 0.72 MG/DL (ref 0.6–1.1)
ERYTHROCYTE [DISTWIDTH] IN BLOOD BY AUTOMATED COUNT: 11.7 % (ref 11–14.5)
GFR SERPL CREATININE-BSD FRML MDRD: >60 ML/MIN/1.73M2
GLUCOSE BLD-MCNC: 73 MG/DL (ref 70–125)
HBA1C MFR BLD: 5.2 % (ref 3.5–6)
HCT VFR BLD AUTO: 38.9 % (ref 35–47)
HGB BLD-MCNC: 13.2 G/DL (ref 12–16)
MCH RBC QN AUTO: 31.6 PG (ref 27–34)
MCHC RBC AUTO-ENTMCNC: 34 G/DL (ref 32–36)
MCV RBC AUTO: 93 FL (ref 80–100)
PLATELET # BLD AUTO: 204 THOU/UL (ref 140–440)
PMV BLD AUTO: 10.2 FL (ref 7–10)
POTASSIUM BLD-SCNC: 4.3 MMOL/L (ref 3.5–5)
RBC # BLD AUTO: 4.19 MILL/UL (ref 3.8–5.4)
SODIUM SERPL-SCNC: 141 MMOL/L (ref 136–145)
TSH SERPL DL<=0.005 MIU/L-ACNC: 0.61 UIU/ML (ref 0.3–5)
WBC: 6.9 THOU/UL (ref 4–11)

## 2019-10-03 ASSESSMENT — MIFFLIN-ST. JEOR: SCORE: 1477.01

## 2019-10-10 ENCOUNTER — COMMUNICATION - HEALTHEAST (OUTPATIENT)
Dept: FAMILY MEDICINE | Facility: CLINIC | Age: 29
End: 2019-10-10

## 2019-10-11 ENCOUNTER — AMBULATORY - HEALTHEAST (OUTPATIENT)
Dept: FAMILY MEDICINE | Facility: CLINIC | Age: 29
End: 2019-10-11

## 2019-10-11 DIAGNOSIS — Z13.1 DIABETES MELLITUS SCREENING: ICD-10-CM

## 2019-10-11 DIAGNOSIS — Z13.220 LIPID SCREENING: ICD-10-CM

## 2019-10-15 ENCOUNTER — RECORDS - HEALTHEAST (OUTPATIENT)
Dept: HEALTH INFORMATION MANAGEMENT | Facility: CLINIC | Age: 29
End: 2019-10-15

## 2019-10-16 ENCOUNTER — AMBULATORY - HEALTHEAST (OUTPATIENT)
Dept: LAB | Facility: CLINIC | Age: 29
End: 2019-10-16

## 2019-10-16 DIAGNOSIS — Z13.220 LIPID SCREENING: ICD-10-CM

## 2019-10-16 DIAGNOSIS — Z13.1 DIABETES MELLITUS SCREENING: ICD-10-CM

## 2019-10-16 LAB
CHOLEST SERPL-MCNC: 181 MG/DL
FASTING STATUS PATIENT QL REPORTED: YES
FASTING STATUS PATIENT QL REPORTED: YES
GLUCOSE BLD-MCNC: 79 MG/DL (ref 70–125)
HDLC SERPL-MCNC: 59 MG/DL
LDLC SERPL CALC-MCNC: 105 MG/DL
TRIGL SERPL-MCNC: 85 MG/DL

## 2019-11-14 ENCOUNTER — OFFICE VISIT (OUTPATIENT)
Dept: OPHTHALMOLOGY | Facility: CLINIC | Age: 29
End: 2019-11-14
Payer: COMMERCIAL

## 2019-11-14 DIAGNOSIS — H52.03 LATENT HYPEROPIA OF BOTH EYES: Primary | ICD-10-CM

## 2019-11-14 ASSESSMENT — CUP TO DISC RATIO
OD_RATIO: 0.25
OS_RATIO: 0.25

## 2019-11-14 ASSESSMENT — REFRACTION_WEARINGRX
SPECS_TYPE: HABITUAL
OD_CYLINDER: SPHERE
OD_SPHERE: +0.25
OS_SPHERE: +0.25
OS_CYLINDER: SPHERE

## 2019-11-14 ASSESSMENT — REFRACTION_MANIFEST
OS_SPHERE: PLANO
OD_SPHERE: +0.50
OS_CYLINDER: SPHERE
OD_CYLINDER: SPHERE

## 2019-11-14 ASSESSMENT — SLIT LAMP EXAM - LIDS
COMMENTS: NORMAL
COMMENTS: NORMAL

## 2019-11-14 ASSESSMENT — VISUAL ACUITY
OD_SC: J1+
OS_SC: 20/20
OD_SC: 20/20
METHOD: SNELLEN - LINEAR
OS_SC: J1+

## 2019-11-14 ASSESSMENT — CONF VISUAL FIELD
OD_NORMAL: 1
OS_NORMAL: 1
METHOD: COUNTING FINGERS

## 2019-11-14 ASSESSMENT — EXTERNAL EXAM - LEFT EYE: OS_EXAM: NORMAL

## 2019-11-14 ASSESSMENT — TONOMETRY
OD_IOP_MMHG: 21
OS_IOP_MMHG: 20
IOP_METHOD: ICARE

## 2019-11-14 ASSESSMENT — EXTERNAL EXAM - RIGHT EYE: OD_EXAM: NORMAL

## 2019-11-14 NOTE — PROGRESS NOTES
A/P  1.) Hyperopia, likely with latent component  -Tired eyes while reading/near work  -Pt deferred dilation today but suspect she is accommodating, can push plus to still read 20/15  -Reviewed with pt, can recheck Rx after dilation. In the meantime trial +1.00 OTC readers    RTC n/a for DFE and damp MRx    I have confirmed the patient's CC, HPI and reviewed Past Medical History, Past Surgical History, Social History, Family History, Problem List, Medication List and agree with Tech note.     Rosa Snyder, OD FAAO FSLS
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
## 2020-02-06 ENCOUNTER — OFFICE VISIT - HEALTHEAST (OUTPATIENT)
Dept: FAMILY MEDICINE | Facility: CLINIC | Age: 30
End: 2020-02-06

## 2020-02-06 DIAGNOSIS — R42 DIZZINESS: ICD-10-CM

## 2020-02-06 DIAGNOSIS — M54.2 NECK PAIN: ICD-10-CM

## 2020-02-06 DIAGNOSIS — R06.02 SHORTNESS OF BREATH: ICD-10-CM

## 2020-02-06 DIAGNOSIS — R11.0 NAUSEA: ICD-10-CM

## 2020-02-06 LAB
FERRITIN SERPL-MCNC: 40 NG/ML (ref 10–130)
FOLATE SERPL-MCNC: 17.5 NG/ML
IRON SATN MFR SERPL: 53 % (ref 20–50)
IRON SERPL-MCNC: 160 UG/DL (ref 42–175)
TIBC SERPL-MCNC: 301 UG/DL (ref 313–563)
TRANSFERRIN SERPL-MCNC: 240 MG/DL (ref 212–360)
VIT B12 SERPL-MCNC: 1631 PG/ML (ref 213–816)

## 2020-02-06 ASSESSMENT — PATIENT HEALTH QUESTIONNAIRE - PHQ9: SUM OF ALL RESPONSES TO PHQ QUESTIONS 1-9: 0

## 2020-02-16 ENCOUNTER — COMMUNICATION - HEALTHEAST (OUTPATIENT)
Dept: FAMILY MEDICINE | Facility: CLINIC | Age: 30
End: 2020-02-16

## 2020-02-17 ENCOUNTER — AMBULATORY - HEALTHEAST (OUTPATIENT)
Dept: FAMILY MEDICINE | Facility: CLINIC | Age: 30
End: 2020-02-17

## 2020-02-17 DIAGNOSIS — R79.89 HIGH SERUM TRANSFERRIN SATURATION: ICD-10-CM

## 2020-03-11 ENCOUNTER — RECORDS - HEALTHEAST (OUTPATIENT)
Dept: ADMINISTRATIVE | Facility: OTHER | Age: 30
End: 2020-03-11

## 2020-05-14 ENCOUNTER — RECORDS - HEALTHEAST (OUTPATIENT)
Dept: ADMINISTRATIVE | Facility: OTHER | Age: 30
End: 2020-05-14

## 2020-05-14 ENCOUNTER — COMMUNICATION - HEALTHEAST (OUTPATIENT)
Dept: FAMILY MEDICINE | Facility: CLINIC | Age: 30
End: 2020-05-14

## 2020-05-14 ENCOUNTER — AMBULATORY - HEALTHEAST (OUTPATIENT)
Dept: SURGERY | Facility: AMBULATORY SURGERY CENTER | Age: 30
End: 2020-05-14

## 2020-05-14 DIAGNOSIS — Z11.59 ENCOUNTER FOR SCREENING FOR OTHER VIRAL DISEASES: ICD-10-CM

## 2020-05-15 ENCOUNTER — OFFICE VISIT - HEALTHEAST (OUTPATIENT)
Dept: FAMILY MEDICINE | Facility: CLINIC | Age: 30
End: 2020-05-15

## 2020-05-15 DIAGNOSIS — Z20.822 SUSPECTED 2019 NOVEL CORONAVIRUS INFECTION: ICD-10-CM

## 2020-05-15 DIAGNOSIS — Z31.69 INFERTILITY COUNSELING: ICD-10-CM

## 2020-05-15 DIAGNOSIS — Z01.818 PREOPERATIVE EXAMINATION: ICD-10-CM

## 2020-05-15 DIAGNOSIS — N92.6 IRREGULAR MENSES: ICD-10-CM

## 2020-05-15 DIAGNOSIS — D64.9 NORMOCHROMIC NORMOCYTIC ANEMIA: ICD-10-CM

## 2020-05-15 DIAGNOSIS — D25.9 UTERINE LEIOMYOMA, UNSPECIFIED LOCATION: ICD-10-CM

## 2020-05-15 DIAGNOSIS — Z11.59 ENCOUNTER FOR SCREENING FOR OTHER VIRAL DISEASES: ICD-10-CM

## 2020-05-15 LAB
ERYTHROCYTE [DISTWIDTH] IN BLOOD BY AUTOMATED COUNT: 10.8 % (ref 11–14.5)
HCT VFR BLD AUTO: 34.8 % (ref 35–47)
HGB BLD-MCNC: 11.6 G/DL (ref 12–16)
MCH RBC QN AUTO: 31 PG (ref 27–34)
MCHC RBC AUTO-ENTMCNC: 33.5 G/DL (ref 32–36)
MCV RBC AUTO: 92 FL (ref 80–100)
PLATELET # BLD AUTO: 210 THOU/UL (ref 140–440)
PMV BLD AUTO: 10.2 FL (ref 7–10)
RBC # BLD AUTO: 3.76 MILL/UL (ref 3.8–5.4)
WBC: 5.7 THOU/UL (ref 4–11)

## 2020-05-15 ASSESSMENT — MIFFLIN-ST. JEOR: SCORE: 1492.66

## 2020-05-17 ENCOUNTER — COMMUNICATION - HEALTHEAST (OUTPATIENT)
Dept: FAMILY MEDICINE | Facility: CLINIC | Age: 30
End: 2020-05-17

## 2020-05-19 ENCOUNTER — RECORDS - HEALTHEAST (OUTPATIENT)
Dept: ADMINISTRATIVE | Facility: OTHER | Age: 30
End: 2020-05-19

## 2020-05-19 ASSESSMENT — MIFFLIN-ST. JEOR: SCORE: 1492.66

## 2020-05-20 ENCOUNTER — SURGERY - HEALTHEAST (OUTPATIENT)
Dept: SURGERY | Facility: AMBULATORY SURGERY CENTER | Age: 30
End: 2020-05-20
Payer: COMMERCIAL

## 2020-07-28 ENCOUNTER — RECORDS - HEALTHEAST (OUTPATIENT)
Dept: ADMINISTRATIVE | Facility: OTHER | Age: 30
End: 2020-07-28

## 2020-08-17 ENCOUNTER — RESULTS ONLY (OUTPATIENT)
Dept: LAB | Age: 30
End: 2020-08-17

## 2020-08-17 ENCOUNTER — APPOINTMENT (OUTPATIENT)
Dept: FAMILY MEDICINE | Facility: CLINIC | Age: 30
End: 2020-08-17
Payer: COMMERCIAL

## 2020-08-18 LAB
SARS-COV-2 RNA SPEC QL NAA+PROBE: NOT DETECTED
SPECIMEN SOURCE: NORMAL

## 2021-01-15 ENCOUNTER — HEALTH MAINTENANCE LETTER (OUTPATIENT)
Age: 31
End: 2021-01-15

## 2021-02-18 ENCOUNTER — OFFICE VISIT - HEALTHEAST (OUTPATIENT)
Dept: FAMILY MEDICINE | Facility: CLINIC | Age: 31
End: 2021-02-18

## 2021-02-18 DIAGNOSIS — E04.9 ENLARGED THYROID: ICD-10-CM

## 2021-02-18 DIAGNOSIS — Z13.1 SCREENING FOR DIABETES MELLITUS: ICD-10-CM

## 2021-02-18 DIAGNOSIS — Z13.220 LIPID SCREENING: ICD-10-CM

## 2021-02-18 DIAGNOSIS — R53.83 FATIGUE, UNSPECIFIED TYPE: ICD-10-CM

## 2021-02-18 LAB
ALBUMIN SERPL-MCNC: 4.4 G/DL (ref 3.5–5)
ALP SERPL-CCNC: 56 U/L (ref 45–120)
ALT SERPL W P-5'-P-CCNC: <9 U/L (ref 0–45)
ANION GAP SERPL CALCULATED.3IONS-SCNC: 8 MMOL/L (ref 5–18)
AST SERPL W P-5'-P-CCNC: 13 U/L (ref 0–40)
BILIRUB SERPL-MCNC: 0.6 MG/DL (ref 0–1)
BUN SERPL-MCNC: 12 MG/DL (ref 8–22)
CALCIUM SERPL-MCNC: 9.3 MG/DL (ref 8.5–10.5)
CHLORIDE BLD-SCNC: 103 MMOL/L (ref 98–107)
CHOLEST SERPL-MCNC: 162 MG/DL
CO2 SERPL-SCNC: 28 MMOL/L (ref 22–31)
CREAT SERPL-MCNC: 0.71 MG/DL (ref 0.6–1.1)
ERYTHROCYTE [DISTWIDTH] IN BLOOD BY AUTOMATED COUNT: 11.4 % (ref 11–14.5)
FASTING STATUS PATIENT QL REPORTED: YES
FERRITIN SERPL-MCNC: 35 NG/ML (ref 10–130)
GFR SERPL CREATININE-BSD FRML MDRD: >60 ML/MIN/1.73M2
GLUCOSE BLD-MCNC: 87 MG/DL (ref 70–125)
HBA1C MFR BLD: 5 %
HCT VFR BLD AUTO: 39.1 % (ref 35–47)
HDLC SERPL-MCNC: 53 MG/DL
HGB BLD-MCNC: 12.8 G/DL (ref 12–16)
IRON SATN MFR SERPL: 41 % (ref 20–50)
IRON SERPL-MCNC: 123 UG/DL (ref 42–175)
LDLC SERPL CALC-MCNC: 96 MG/DL
MCH RBC QN AUTO: 31.1 PG (ref 27–34)
MCHC RBC AUTO-ENTMCNC: 32.7 G/DL (ref 32–36)
MCV RBC AUTO: 95 FL (ref 80–100)
PLATELET # BLD AUTO: 211 THOU/UL (ref 140–440)
PMV BLD AUTO: 11 FL (ref 7–10)
POTASSIUM BLD-SCNC: 4.1 MMOL/L (ref 3.5–5)
PROT SERPL-MCNC: 7.5 G/DL (ref 6–8)
RBC # BLD AUTO: 4.12 MILL/UL (ref 3.8–5.4)
SODIUM SERPL-SCNC: 139 MMOL/L (ref 136–145)
T3FREE SERPL-MCNC: 3 PG/ML (ref 1.9–3.9)
T4 FREE SERPL-MCNC: 1.2 NG/DL (ref 0.7–1.8)
TIBC SERPL-MCNC: 300 UG/DL (ref 313–563)
TRANSFERRIN SERPL-MCNC: 240 MG/DL (ref 212–360)
TRIGL SERPL-MCNC: 65 MG/DL
TSH SERPL DL<=0.005 MIU/L-ACNC: 0.81 UIU/ML (ref 0.3–5)
VIT B12 SERPL-MCNC: 525 PG/ML (ref 213–816)
WBC: 6.3 THOU/UL (ref 4–11)

## 2021-02-19 LAB — 25(OH)D3 SERPL-MCNC: 33.6 NG/ML (ref 30–80)

## 2021-02-21 ENCOUNTER — COMMUNICATION - HEALTHEAST (OUTPATIENT)
Dept: FAMILY MEDICINE | Facility: CLINIC | Age: 31
End: 2021-02-21

## 2021-02-25 ENCOUNTER — HOSPITAL ENCOUNTER (OUTPATIENT)
Dept: ULTRASOUND IMAGING | Facility: CLINIC | Age: 31
Discharge: HOME | End: 2021-02-25
Attending: NURSE PRACTITIONER

## 2021-02-25 DIAGNOSIS — E04.9 ENLARGED THYROID: ICD-10-CM

## 2021-04-05 ENCOUNTER — COMMUNICATION - HEALTHEAST (OUTPATIENT)
Dept: FAMILY MEDICINE | Facility: CLINIC | Age: 31
End: 2021-04-05

## 2021-04-05 DIAGNOSIS — M54.2 NECK PAIN: ICD-10-CM
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
## 2021-05-28 NOTE — PROGRESS NOTES
Assessment and Plan:     1. Neck pain  Ambulatory referral to Spine Care   2. Thyroid nodule  US Thyroid   3. Nausea  ondansetron (ZOFRAN-ODT) 4 MG disintegrating tablet     Patient appears to have myofascial discomfort within the neck.  Other differentials include bulging or herniated disc.  Will refer to spine clinic for further evaluation.  Discussed symptomatic treatment including rest, ice, stretching activities.  She will continue massage and acupuncture.  Patient's thyroid appears mildly enlarged.  Will obtain thyroid ultrasound for further evaluation and to determine stability of previous thyroid nodule.  She uses Zofran as needed for nausea when she develops neck pain.  She is to follow-up if symptoms persist or worsen.    Subjective:     Nguyen is a 29 y.o. female presenting to the clinic for concerns for ongoing neck pain.  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  She has no known injury.  She had MRI performed in 2015 which was normal.  She was told that it was myofascial discomfort.  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  She is sitting for most of the day.  She has noticed that her neck pain has worsened.  She feels a constant stiffness in the neck.  Patient presented to urgent care Monday where she was prescribed cyclobenzaprine.  When she has worsening pain within the neck, she develops nausea and dizziness.  Zofran provides assistance.  When she lifts her left arm and rests it on a chair, she develops numbness and tingling.  She feels a sharp sensation with turning her head to the right.  She hears a crunching sound when she stretches.  She has been seeing a counselor.  She also integrates acupuncture and massage therapy into her self-care.  Patient was noted to have a small thyroid nodule in 2015.  She has been dealing with infertility, so she has had her thyroid labs checked which have been normal.    Review of Systems: A complete 14 point review of  systems was obtained and is negative or as stated in the history of present illness.    Social History     Socioeconomic History     Marital status:      Spouse name: Not on file     Number of children: Not on file     Years of education: Not on file     Highest education level: Not on file   Occupational History     Not on file   Social Needs     Financial resource strain: Not on file     Food insecurity:     Worry: Not on file     Inability: Not on file     Transportation needs:     Medical: Not on file     Non-medical: Not on file   Tobacco Use     Smoking status: Never Smoker     Smokeless tobacco: Never Used   Substance and Sexual Activity     Alcohol use: Yes     Frequency: Monthly or less     Comment: rare     Drug use: No     Sexual activity: Yes     Partners: Male     Comment:    Lifestyle     Physical activity:     Days per week: Not on file     Minutes per session: Not on file     Stress: Not on file   Relationships     Social connections:     Talks on phone: Not on file     Gets together: Not on file     Attends [employer] service: Not on file     Active member of club or organization: Not on file     Attends meetings of clubs or organizations: Not on file     Relationship status: Not on file     Intimate partner violence:     Fear of current or ex partner: Not on file     Emotionally abused: Not on file     Physically abused: Not on file     Forced sexual activity: Not on file   Other Topics Concern     Not on file   Social History Narrative     Not on file       Active Ambulatory Problems     Diagnosis Date Noted     Dizziness 01/10/2019     Nausea 01/10/2019     Resolved Ambulatory Problems     Diagnosis Date Noted     No Resolved Ambulatory Problems     Past Medical History:   Diagnosis Date     Anxiety        Family History   Problem Relation Age of Onset     Hyperlipidemia Mother      Skin cancer Mother      Hypertension Father      Heart disease Father      Skin cancer Maternal  "Grandmother      Arthritis Maternal Grandmother      Heart disease Paternal Grandmother      Diabetes Cousin        Objective:     BP 92/68   Pulse 89   Ht 5' 8\" (1.727 m)   Wt 162 lb 14.4 oz (73.9 kg)   SpO2 98%   BMI 24.77 kg/m      Patient is alert, in no obvious distress.   Skin: Warm, dry.   Neck: Supple, no lymphadenopathy.  Thyroid is symmetrical, but mildly enlarged.   Lungs:  Clear to auscultation. Respirations even and unlabored.  No wheezing or rales noted.   Heart:  Regular rate and rhythm.  No murmurs.   Musculoskeletal:  She is tender to palpation of her bilateral sternocleidomastoid and trapezius muscles.               "
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
## 2021-05-29 NOTE — TELEPHONE ENCOUNTER
----- Message from Marco Bell DO sent at 6/17/2019  7:47 AM CDT -----  MR I of the brain is normal.  MRA of the head is normal.    Flexion and extension x-rays of the cervical spine show no instability from flexion to extension.    Recommend starting [facility].

## 2021-05-29 NOTE — TELEPHONE ENCOUNTER
Phone call to patient to review results and provider's recommendations. Left message to return call.

## 2021-05-29 NOTE — TELEPHONE ENCOUNTER
"Patient had called and left message on nurse navigation line stating \"calling for my results. I am at work and not able to take call. Please leave a detailed message at this number [phone].\"  Returned her call. Left detailed message on voicemail as requested.  "

## 2021-05-29 NOTE — TELEPHONE ENCOUNTER
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  She has a history of neck pain.  Was in spine clinic yesterday and her blood pressure was slightly elevated.  She says she told them she often feels a \"whooshing\" sensation in her neck.  They ordered an MRI which is scheduled for 6/19/19.    Since her appointment she has been checking her blood pressure and is concerned that it is higher than usual.  Today at work her blood pressure was: 144/72 and just now at [employer] her blood pressure was:  130/90.    She is trying to think of any symptoms that could be related to the high blood pressure.  Wednesday night she had cramping abdominal pain and spotting - assumed it was her period.  Pain resolved.  She occasionally gets \"weird sensations in chest\" - says she has had them checked before.  Nothing new.  When it occurs it lasts a second and is \"uncomfortable sensation 1 or 2 out of 10.\"  Not occurring now.    No headache.  No difficulty breathing.  She is not currently pregnant.    Triaged to disposition of See PCP Within 2 Weeks.  Caller warm transferred to scheduling.  Scheduled for 6/13/19.    Advised patient to call back if weakness or numbness of face, arm or leg on one side of the body, headache, chest pain or difficulty breathing occurs or if blood pressure is over 160/100.    Afia Weathers RN  Triage Nurse Advisor    Reason for Disposition    [1] Systolic BP  >= 130 OR Diastolic >= 80 AND [2] not taking BP medications    Protocols used: HIGH BLOOD PRESSURE-A-AH      "

## 2021-05-29 NOTE — PROGRESS NOTES
"Assessment and Plan:     1. Elevated blood pressure reading  Basic Metabolic Panel    Urinalysis-UC if Indicated    Thyroid Cascade   2. Neck pain       Patient's blood pressure is normal today.  She will monitor her blood pressure at home.  Will check BMP, urinalysis, thyroid cascade.  She had a cardiac work-up last summer which was normal.  She continues to see the spine clinic for her chronic neck pain.  Discussed that her pain can certainly be contributing to her elevated readings.  She will follow-up with symptoms persist or worsen.    Subjective:     Nguyen is a 29 y.o. female presenting to the clinic for concerns for elevated blood pressure readings.  Patient has been experiencing chronic neck pain.  She was seen at the spine clinic on 6/6/2019 where her blood pressure was 143/84.  Patient has been checking her blood pressure at home once daily and it has been ranging 120-130/80-92.  Patient works at a clinic now and states her blood pressure has been mildly elevated at the clinic.  When this occurs, her face feels hot and she has a \"balloon sensation \"in her head.  She has an MRI scheduled today for her cervical spine.  She did see a chiropractor and acupuncturist yesterday.  Her blood pressure was normal yesterday.  Her father has a history of CAD and has a stent placed.  Patient had a cardiac work-up last summer where she had a normal EKG on 8/14/2018 and a normal echocardiogram on 11/26/2018.  She occasionally experiences sharp pains within her mid chest and was told that it was due to dense breast tissue.  She otherwise denies shortness of breath with exertion, edema, orthopnea, syncope.    Review of Systems: A complete 14 point review of systems was obtained and is negative or as stated in the history of present illness.    Social History     Socioeconomic History     Marital status:      Spouse name: Not on file     Number of children: Not on file     Years of education: Not on file     Highest " "education level: Not on file   Occupational History     Not on file   Social Needs     Financial resource strain: Not on file     Food insecurity:     Worry: Not on file     Inability: Not on file     Transportation needs:     Medical: Not on file     Non-medical: Not on file   Tobacco Use     Smoking status: Never Smoker     Smokeless tobacco: Never Used   Substance and Sexual Activity     Alcohol use: Yes     Frequency: Monthly or less     Comment: rare     Drug use: No     Sexual activity: Yes     Partners: Male     Comment:    Lifestyle     Physical activity:     Days per week: Not on file     Minutes per session: Not on file     Stress: Not on file   Relationships     Social connections:     Talks on phone: Not on file     Gets together: Not on file     Attends Rastafari service: Not on file     Active member of club or organization: Not on file     Attends meetings of clubs or organizations: Not on file     Relationship status: Not on file     Intimate partner violence:     Fear of current or ex partner: Not on file     Emotionally abused: Not on file     Physically abused: Not on file     Forced sexual activity: Not on file   Other Topics Concern     Not on file   Social History Narrative     Not on file       Active Ambulatory Problems     Diagnosis Date Noted     Dizziness 01/10/2019     Nausea 01/10/2019     Resolved Ambulatory Problems     Diagnosis Date Noted     No Resolved Ambulatory Problems     Past Medical History:   Diagnosis Date     Anxiety        Family History   Problem Relation Age of Onset     Hyperlipidemia Mother      Skin cancer Mother      Hypertension Father      Heart disease Father      Skin cancer Maternal Grandmother      Arthritis Maternal Grandmother      Heart disease Paternal Grandmother      Diabetes Cousin        Objective:     /70   Pulse 87   Ht 5' 8\" (1.727 m)   Wt 157 lb 11.2 oz (71.5 kg)   LMP 05/25/2019   SpO2 100%   BMI 23.98 kg/m      Patient is " alert, in no obvious distress.   Skin: Warm, dry.    Neck: Supple, no lymphadenopathy. No thyromegaly.  Lungs:  Clear to auscultation. Respirations even and unlabored.  No wheezing or rales noted.   Heart:  Regular rate and rhythm.  No murmurs.   Abdomen: Soft, nontender.  No organomegaly. Bowel sounds normoactive. No guarding or masses noted.

## 2021-06-02 VITALS — HEIGHT: 68 IN | BODY MASS INDEX: 23.54 KG/M2 | WEIGHT: 155.3 LBS

## 2021-06-02 VITALS — BODY MASS INDEX: 23.76 KG/M2 | HEIGHT: 68 IN | WEIGHT: 156.8 LBS

## 2021-06-02 VITALS — BODY MASS INDEX: 23.64 KG/M2 | HEIGHT: 68 IN | WEIGHT: 156 LBS

## 2021-06-02 VITALS — WEIGHT: 155.3 LBS | HEIGHT: 68 IN | BODY MASS INDEX: 23.54 KG/M2

## 2021-06-02 NOTE — PROGRESS NOTES
Assessment and Plan:     1. Dizziness  HM2(CBC w/o Differential)    Basic Metabolic Panel    Thyroid Cascade    Glycosylated Hemoglobin A1c    meclizine (ANTIVERT) 25 mg tablet   2. Nausea       Patient appears to have benign paroxysmal positional vertigo.  Symptoms improve with meclizine.  Provided prescription for meclizine to use in the future as needed.  She is to avoid taking this with other sedatives.  We will also rule out anemia, electrolyte imbalance, thyroid disease, diabetes.  Discussed concerning symptoms including headache, blurry vision, numbness and tingling of extremities, muscular weakness, vomiting.  If this occurs, suggest ER evaluation.  She is content with the plan.    Subjective:     Nguyen is a 29 y.o. female presenting to the clinic for concerns for dizziness.  Patient states 5 years ago, she was diagnosed with benign paroxysmal positional vertigo.  Her symptoms improved after one day.  Patient states 1 month ago while receiving a massage, she flipped from her front to her back and rotated her head to the right.  She then developed dizziness.  She was able to drive home.  She took meclizine and symptoms improved after few hours.  This past weekend, she was laying on her back at an exercise class when she developed dizziness.  This lasted for 1 hour and improved with meclizine.  2 days ago, she was in the shower and tilted her head back.  The dizziness lasted for a few seconds. When she is dizzy, she experiences nausea without vomiting.  She denies headache, blurry vision, numbness and tingling of extremities, muscular weakness, palpitations, chest pain, shortness of breath with exertion.  She states she feels as though the room is spinning when this occurs.  Meclizine typically improves her symptoms.  She denies ear pain or pressure.  She has not had any recent cold symptoms.  She denies recent travel.    Review of Systems: A complete 14 point review of systems was obtained and is negative or  as stated in the history of present illness.    Social History     Socioeconomic History     Marital status:      Spouse name: Not on file     Number of children: Not on file     Years of education: Not on file     Highest education level: Not on file   Occupational History     Not on file   Social Needs     Financial resource strain: Not on file     Food insecurity:     Worry: Not on file     Inability: Not on file     Transportation needs:     Medical: Not on file     Non-medical: Not on file   Tobacco Use     Smoking status: Never Smoker     Smokeless tobacco: Never Used   Substance and Sexual Activity     Alcohol use: Yes     Frequency: Monthly or less     Comment: rare     Drug use: No     Sexual activity: Yes     Partners: Male     Comment:    Lifestyle     Physical activity:     Days per week: Not on file     Minutes per session: Not on file     Stress: Not on file   Relationships     Social connections:     Talks on phone: Not on file     Gets together: Not on file     Attends Jehovah's witness service: Not on file     Active member of club or organization: Not on file     Attends meetings of clubs or organizations: Not on file     Relationship status: Not on file     Intimate partner violence:     Fear of current or ex partner: Not on file     Emotionally abused: Not on file     Physically abused: Not on file     Forced sexual activity: Not on file   Other Topics Concern     Not on file   Social History Narrative     Not on file       Active Ambulatory Problems     Diagnosis Date Noted     Dizziness 01/10/2019     Nausea 01/10/2019     Resolved Ambulatory Problems     Diagnosis Date Noted     No Resolved Ambulatory Problems     Past Medical History:   Diagnosis Date     Anxiety        Family History   Problem Relation Age of Onset     Hyperlipidemia Mother      Skin cancer Mother      Hypertension Father      Heart disease Father      Skin cancer Maternal Grandmother      Arthritis Maternal  "Grandmother      Heart disease Paternal Grandmother      Diabetes Cousin        Objective:     /64   Pulse 76   Ht 5' 8\" (1.727 m)   Wt 157 lb 4.8 oz (71.4 kg)   SpO2 100%   BMI 23.92 kg/m      Patient is alert, in no obvious distress.   Skin: Warm, dry.  No lesions or rashes.  Skin turgor rapid return.   HEENT:  Head normocephalic, atraumatic.  Eyes normal.  PERRL.  EOM's intact.  No nystagmus. Ears normal.  Nose patent, mucosa pink.  Oropharynx mucosa pink.  No lesions or tonsillar enlargement.   Neck: Supple, no lymphadenopathy, JVD, bruits noted.  No thyromegaly.  Lungs:  Clear to auscultation. Respirations even and unlabored.  No wheezing or rales noted.   Heart:  Regular rate and rhythm.  No murmurs, S3, S4, gallops, or rubs.    Musculoskeletal:  Full ROM of extremities.  DTRs symmetrical, sensations intact.  No obvious deformity.  Muscle strength equal +5/5.   Neurological:  Cranial nerves 2-12 intact.  Delma Hallpike maneuver is negative.               "

## 2021-06-03 ENCOUNTER — RECORDS - HEALTHEAST (OUTPATIENT)
Dept: SCHEDULING | Facility: CLINIC | Age: 31
End: 2021-06-03

## 2021-06-03 ENCOUNTER — RECORDS - HEALTHEAST (OUTPATIENT)
Dept: ADMINISTRATIVE | Facility: OTHER | Age: 31
End: 2021-06-03

## 2021-06-03 VITALS — BODY MASS INDEX: 23.95 KG/M2 | HEIGHT: 68 IN | WEIGHT: 158 LBS

## 2021-06-03 VITALS — HEIGHT: 68 IN | BODY MASS INDEX: 24.69 KG/M2 | WEIGHT: 162.9 LBS

## 2021-06-03 VITALS
BODY MASS INDEX: 23.84 KG/M2 | WEIGHT: 157.3 LBS | SYSTOLIC BLOOD PRESSURE: 110 MMHG | DIASTOLIC BLOOD PRESSURE: 64 MMHG | HEART RATE: 76 BPM | HEIGHT: 68 IN | OXYGEN SATURATION: 100 %

## 2021-06-03 VITALS — BODY MASS INDEX: 23.9 KG/M2 | HEIGHT: 68 IN | WEIGHT: 157.7 LBS

## 2021-06-03 DIAGNOSIS — Z31.69 INFERTILITY COUNSELING: ICD-10-CM

## 2021-06-04 VITALS
BODY MASS INDEX: 23.82 KG/M2 | BODY MASS INDEX: 23.82 KG/M2 | WEIGHT: 159 LBS | HEIGHT: 69 IN | BODY MASS INDEX: 23.82 KG/M2 | WEIGHT: 159 LBS | BODY MASS INDEX: 23.82 KG/M2 | HEIGHT: 69 IN

## 2021-06-05 NOTE — PROGRESS NOTES
Assessment and Plan:     1. Dizziness  Ambulatory referral to Neurology    Ferritin    Iron and Transferrin Iron Binding Capacity    Folate, Serum    Vitamin B12   2. Neck pain  cyclobenzaprine (FLEXERIL) 5 MG tablet   3. Nausea  ondansetron (ZOFRAN-ODT) 4 MG disintegrating tablet   4. Shortness of breath       Will rule out iron deficiency anemia and other nutritional deficiencies.  Will refer to [facility] for further evaluation.  She is to change positions slowly.  She continues cyclobenzaprine as needed for neck pain and Zofran as needed for nausea.  Offered chest x-ray and EKG for further evaluation of shortness of breath, but she declines as it has resolved.  She had a normal echocardiogram in 2018.  She is to follow-up if symptoms persist or worsen.  She is content with the plan. I spent 25 minutes with the patient with greater than 50% spent discussing symptoms, treatment options, and coordination of care.     Subjective:     Nguyen is a 29 y.o. female presenting to the clinic for ongoing dizziness.  Patient was seen in clinic on 10/3/2019 where she had been experiencing dizziness.  She has a history of benign paroxysmal positional vertigo.  Hemogram, BMP, thyroid cascade were unremarkable.  She was treated with meclizine.  She does have chronic neck pain and did see the spine clinic who ordered a brain MRI which was also unremarkable.  Patient has had intermittent dizziness since October.  It occurs both at rest and with position changes.  The dizziness is prominent when she takes a shower and tilts her head back.  Dizziness can last from a few minutes to a few hours.  She has noticed there is a correlation with caffeine consumption and worsening dizziness.  She has been trying to void caffeine.  She has decreased her stress level.  She is consuming a healthy diet and has increased her vegetable intake.  On Friday, she had mild shortness of breath at work.  She did not have chest  pain, chest tightness, cold symptoms, fever,.  Her symptoms subsided after few hours.  She has not traveled recently.  She has not had palpitations.    Review of Systems: A complete 14 point review of systems was obtained and is negative or as stated in the history of present illness.    Social History     Socioeconomic History     Marital status:      Spouse name: Not on file     Number of children: Not on file     Years of education: Not on file     Highest education level: Not on file   Occupational History     Not on file   Social Needs     Financial resource strain: Not on file     Food insecurity:     Worry: Not on file     Inability: Not on file     Transportation needs:     Medical: Not on file     Non-medical: Not on file   Tobacco Use     Smoking status: Never Smoker     Smokeless tobacco: Never Used   Substance and Sexual Activity     Alcohol use: Yes     Frequency: Monthly or less     Comment: rare     Drug use: No     Sexual activity: Yes     Partners: Male     Comment:    Lifestyle     Physical activity:     Days per week: Not on file     Minutes per session: Not on file     Stress: Not on file   Relationships     Social connections:     Talks on phone: Not on file     Gets together: Not on file     Attends Orthodoxy service: Not on file     Active member of club or organization: Not on file     Attends meetings of clubs or organizations: Not on file     Relationship status: Not on file     Intimate partner violence:     Fear of current or ex partner: Not on file     Emotionally abused: Not on file     Physically abused: Not on file     Forced sexual activity: Not on file   Other Topics Concern     Not on file   Social History Narrative     Not on file       Active Ambulatory Problems     Diagnosis Date Noted     Dizziness 01/10/2019     Nausea 01/10/2019     Resolved Ambulatory Problems     Diagnosis Date Noted     No Resolved Ambulatory Problems     Past Medical History:   Diagnosis  Date     Anxiety        Family History   Problem Relation Age of Onset     Hyperlipidemia Mother      Skin cancer Mother      Hypertension Father      Heart disease Father      Skin cancer Maternal Grandmother      Arthritis Maternal Grandmother      Heart disease Paternal Grandmother      Diabetes Cousin        Objective:     /62 (Patient Site: Right Arm, Cuff Size: Adult Regular)   Pulse 84   Wt 158 lb 3.2 oz (71.8 kg)   LMP 01/22/2020   SpO2 99%   BMI 24.05 kg/m      Patient is alert, in no obvious distress.   Skin: Warm, dry.  No lesions or rashes.  Skin turgor rapid return.   HEENT:  Head normocephalic, atraumatic.  Eyes normal.  Ears normal.  Nose patent, mucosa pink.  Oropharynx mucosa pink.  No lesions or tonsillar enlargement.   Neck: Supple, no lymphadenopathy.  No thyromegaly.  Lungs:  Clear to auscultation. Respirations even and unlabored.  No wheezing or rales noted.   Heart:  Regular rate and rhythm.  No murmurs, S3, S4, gallops, or rubs.    Musculoskeletal:  Full ROM of extremities.  DTRs symmetrical, sensations intact.  No obvious deformity.  Muscle strength equal +5/5.   Neurological:  Cranial nerves 2-12 intact.

## 2021-06-08 NOTE — TELEPHONE ENCOUNTER
Left message to call back for: pt/Nguyen  Information to relay to patient:  LDM for pt/Nguyen to call bk to schedule a pre-op apt with Dr Faustino Stevens Friday, 05.15 or Dr Aleks Hicks Monday, 05.18  Dr Abena Araujo/[facility]  SD: 05.20.2020  [place]  Dilation and curettage

## 2021-06-08 NOTE — TELEPHONE ENCOUNTER
Patient Returning Call  Reason for call:  Appointment   Information relayed to patient:  Yes, scheduled appointment 5/15 at 3:40  Patient has additional questions:  No  If YES, what are your questions/concerns:      Okay to leave a detailed message?: No call back needed

## 2021-06-08 NOTE — TELEPHONE ENCOUNTER
New Appointment Needed  What is the reason for the visit:    Pre-Op Appt Request  When is the surgery? :  5/20/20  Where is the surgery?:   [facility]  Who is the surgeon? :  Dr. Abena Chin with [facility]  What type of surgery is being done?: Dilation and curettage  Provider Preference: Any available  How soon do you need to be seen?: today  Waitlist offered?: No  Okay to leave a detailed message:  Yes

## 2021-06-08 NOTE — PROGRESS NOTES
Preoperative Exam    Scheduled Procedure:        Procedure  Laterality  Anesthesia    HYSTEROSCOPY WITH POLYPECTOMY, DILATION AND CURETTAGE              Surgery Date:  5/20/20  Surgery Location: [facility], fax [phone]    Surgeon:  Dr. Abena Araujo ([facility])    Assessment/Plan:     1. Preoperative examination  Preoperative cardiovascular examination completed.  Irregular menses with dysfunctional uterine bleeding.  CBC with mild anemia today consistent with menstrual bleeding x 45 days.  No contraindication to schedule procedure.  - HM2(CBC w/o Differential)    2. Irregular menses  Irregular menses reviewed with dysfunction uterine bleeding.  D and C scheduled.    3. Normochromic normocytic anemia  Mild normochromic normocytic anemia noted.    Lab Results   Component Value Date    WBC 5.7 05/15/2020    HGB 11.6 (L) 05/15/2020    HCT 34.8 (L) 05/15/2020    MCV 92 05/15/2020     05/15/2020        4. Uterine leiomyoma, unspecified location  Uterine leiomyoma described.    5. Infertility counseling  Infertility management with Dr. Abena Araujo ongoing.        Surgical Procedure Risk: Low (reported cardiac risk generally < 1%)  Have you had prior anesthesia?: No  Have you or any family members had a previous anesthesia reaction:  Yes: mother had nausea  Do you or any family members have a history of a clotting or bleeding disorder?: No  Cardiac Risk Assessment: no increased risk for major cardiac complications    APPROVAL GIVEN to proceed with proposed procedure, without further diagnostic evaluation      Functional Status: Independent  Patient plans to recover at home with family.     Subjective:      Nguyen Jacob is a 30 y.o. female who presents for a preoperative consultation.  D&C scheduled for ongoing concerns of menorrhagia x45 days.  Tried OCP etc.  Uterine fibroid described.  Follows with Dr. Abena Araujo with history of infertility evaluation.  Patient needs to complete COVID testing  prior to procedure.  Denies recent illness including cough shortness of breath or fever.  Past medical social and family history reviewed as noted below.    All other systems reviewed and are negative, other than those listed in the HPI.     - Marco x 2014  No children  Paps done at ob/gyn (Dr. 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    Current Outpatient Medications   Medication Sig Dispense Refill     cholecalciferol, vitamin D3, 5,000 unit Tab Take by mouth.       cyclobenzaprine (FLEXERIL) 5 MG tablet Take 1-2 tablets (5-10 mg total) by mouth 3 (three) times a day as needed for muscle spasms. 20 tablet 0     meclizine (ANTIVERT) 25 mg tablet Take 1 tablet (25 mg total) by mouth 3 (three) times a day as needed for dizziness or nausea. 30 tablet 1     multivitamin therapeutic tablet Take 1 tablet by mouth daily.       ondansetron (ZOFRAN-ODT) 4 MG disintegrating tablet Take 1 tablet (4 mg total) by mouth every 12 (twelve) hours as needed for nausea. 20 tablet 1     No current facility-administered medications for this visit.         No Known Allergies    Patient Active Problem List   Diagnosis     Dizziness     Nausea       Past Medical History:   Diagnosis Date     Anxiety        Past  "Surgical History:   Procedure Laterality Date     WISDOM TOOTH EXTRACTION         Social History     Socioeconomic History     Marital status:      Spouse name: Not on file     Number of children: Not on file     Years of education: Not on file     Highest education level: Not on file   Occupational History     Not on file   Social Needs     Financial resource strain: Not on file     Food insecurity     Worry: Not on file     Inability: Not on file     Transportation needs     Medical: Not on file     Non-medical: Not on file   Tobacco Use     Smoking status: Never Smoker     Smokeless tobacco: Never Used   Substance and Sexual Activity     Alcohol use: Yes     Frequency: Monthly or less     Comment: rare     Drug use: No     Sexual activity: Yes     Partners: Male     Comment:    Lifestyle     Physical activity     Days per week: Not on file     Minutes per session: Not on file     Stress: Not on file   Relationships     Social connections     Talks on phone: Not on file     Gets together: Not on file     Attends Jewish service: Not on file     Active member of club or organization: Not on file     Attends meetings of clubs or organizations: Not on file     Relationship status: Not on file     Intimate partner violence     Fear of current or ex partner: Not on file     Emotionally abused: Not on file     Physically abused: Not on file     Forced sexual activity: Not on file   Other Topics Concern     Not on file   Social History Narrative     Not on file       Patient Care Team:  Rochelle Farias CNP as PCP - General (Family Medicine)  Rochelle Farias CNP as Assigned PCP          Objective:     Vitals:    05/15/20 1539   BP: 126/72   Pulse: 60   SpO2: 99%   Weight: 159 lb (72.1 kg)   Height: 5' 8.5\" (1.74 m)         Physical Exam:  Physical Exam    General Appearance:  Alert, cooperative, no distress, appears stated age   HEENT:  Normal.  No acute findings.   Neck: Supple, symmetrical, no " adenopathy.   Lungs:   Clear to auscultation bilaterally, respirations unlabored.  No expiratory wheeze or inspiratory crackles noted.   Heart:  Regular rate and rhythm, S1, S2 normal, no murmur, rub or gallop   Abdomen:   Soft, non-tender, positive bowel sounds, no masses, no organomegaly   Extremities: Extremities normal.  No cyanosis or edema   Skin: Warm, dry.  Skin color, texture, turgor normal, no rashes or lesions   Neurologic: Nonfocal.          There are no Patient Instructions on file for this visit.      Labs:  Recent Results (from the past 24 hour(s))   HM2(CBC w/o Differential)    Collection Time: 05/15/20  3:51 PM   Result Value Ref Range    WBC 5.7 4.0 - 11.0 thou/uL    RBC 3.76 (L) 3.80 - 5.40 mill/uL    Hemoglobin 11.6 (L) 12.0 - 16.0 g/dL    Hematocrit 34.8 (L) 35.0 - 47.0 %    MCV 92 80 - 100 fL    MCH 31.0 27.0 - 34.0 pg    MCHC 33.5 32.0 - 36.0 g/dL    RDW 10.8 (L) 11.0 - 14.5 %    Platelets 210 140 - 440 thou/uL    MPV 10.2 (H) 7.0 - 10.0 fL       Immunization History   Administered Date(s) Administered     DTaP, historic 12/30/2010     Dtap 1990, 1990, 1990, 09/16/1991, 07/06/1995     Hep A, Adult IM (19yr & older) 12/30/2010, 05/18/2016     Hep A, historic 02/16/2007     Hep B, Peds or Adolescent 08/23/2000, 09/27/2000, 04/12/2001     Hep B, historic 08/23/2000, 09/27/2000, 04/12/2001     Hepatitis A, Ped/Adol 2 Dose IM (18yr & under) 02/16/2007     Hib (PRP-T) 1990, 03/25/1991, 06/25/1991     IPV 1990, 1990, 09/16/1991, 07/06/1995     Influenza, Seasonal, Inj PF IIV3 11/24/2006, 12/30/2010     Influenza,seasonal, Inj IIV3 11/24/2006, 10/29/2007, 10/25/2013     MMR 06/25/1991, 10/17/2001     Meningococcal MCV4P 02/16/2007     Meningococcal MPSV4, SQ 02/16/2007     Td, Adult, Absorbed 08/23/2002     Td, adult adsorbed, PF 08/23/2002     Tdap 12/30/2010     Typhoid, Inj, Inactive 05/23/2007, 12/30/2010, 05/18/2016           Electronically signed by Faustino BOURGEOIS  MD Rodney 05/15/20 3:40 PM

## 2021-06-08 NOTE — PATIENT INSTRUCTIONS - HE
Your symptoms show that you may have coronavirus (COVID-19). This illness can cause fever, cough and trouble breathing. Many people get a mild case and get better on their own. Some people can get very sick.     Not all patients are tested for COVID-19. If you need to be tested, your care team will let you know.     How can I protect others?    Without a test, we can't know for sure that you have COVID-19. For safety, it's very important to follow these rules.    Stay home and away from others (self-isolate) until:    At least 10 days have passed since your symptoms started. And     You've had no fever--and no medicine that reduces fever--for 3 full days (72 hours). And      Your other symptoms have resolved (gotten better).     During this time:    Stay in your own room (and use your own bathroom), if you can.    Stay away from others in your home. No hugging, kissing or shaking hands.    No visitors.    Don't go to work, school or anywhere else.     Clean  high touch  surfaces often (doorknobs, counters, handles, etc.). Use a household cleaning spray or wipes.    Cover your mouth and nose with a mask, tissue or wash cloth to avoid spreading germs.    Wash your hands and face often. Use soap and water.    For more tips, go to https://www.cdc.gov/coronavirus/2019-ncov/downloads/10Things.pdf.    How can I take care of myself?    1. Get lots of rest. Drink extra fluids (unless a doctor has told you not to).     2. Take Tylenol (acetaminophen) for fever or pain. If you have liver or kidney problems, ask your family doctor if it's okay to take Tylenol.     Adults can take either:     650 mg (two 325 mg pills) every 4 to 6 hours, or     1,000 mg (two 500 mg pills) every 8 hours as needed.     Note: Don't take more than 3,000 mg in one day.   Acetaminophen is found in many medicines (both prescribed and over-the-counter medicines). Read all labels to be sure you don't take too much.   For children, check the Tylenol  bottle for the right dose. The dose is based on the child's age or weight.    3. If you have other health problems (like cancer, heart failure, an organ transplant or severe kidney disease): Call your specialty clinic if you don't feel better in the next 2 days.    4. Know when to call 911: If your breathing is so bad that it keeps you from doing normal activities, call 911 or go to the emergency room. Tell them that you've been staying home and may have COVID-19.      What are the symptoms of COVID-19?     The most common symptoms are cough, fever and trouble breathing.     Less common symptoms include body aches, chills, diarrhea (loose, watery poops), fatigue (feeling very tired), headache, runny nose, sore throat and loss of smell.     COVID-19 can cause severe coughing (bronchitis) and lung infection (pneumonia).    How does it spread?     The virus may spread when a person coughs or sneezes into the air. The virus can travel about 6 feet this way, and it can live on surfaces.      Common  (household disinfectants) will kill the virus.    Who is at risk?  Anyone can catch COVID-19 if they're around someone who has the virus.    How can others protect themselves?     Stay away from people who have COVID-19 (or symptoms of COVID-19).    Wash hands often with soap and water. Or, use hand  with at least 60% alcohol.    Avoid touching the eyes, nose or mouth.     Wear a face mask when you go out in public, when sick or when caring for a sick person.      For more about COVID-19 and caring for yourself at home, please visit the CDC website at https://www.cdc.gov/coronavirus/2019-ncov/about/steps-when-sick.html.     To learn about care at [facility], go to [email or web address].    Below are the COVID-19 hotlines at the [place] [facility] ([facility]). Interpreters are available.     For health questions: Call 929-008-4753 or 1-207.287.4055 (7 a.m. to 7  p.m.)    For questions about schools and childcare: Call 267-051-3695 or 1-872.561.1010 (7 a.m. to 7 p.m.)

## 2021-06-15 NOTE — PROGRESS NOTES
Assessment and Plan:     1. Fatigue, unspecified type  We will rule out anemia, vitamin deficiencies, thyroid disease.  Further plans pending the results.  Recommend good sleep hygiene.  - HM2(CBC w/o Differential)  - Comprehensive Metabolic Panel  - Vitamin D, Total (25-Hydroxy)  - Vitamin B12  - Ferritin  - Iron and Transferrin Iron Binding Capacity    2. Enlarged thyroid  Patient has some anxiety regarding her thyroid.  Will obtain thyroid ultrasound for further evaluation and to determine stability of prior nodule.  We will also check thyroid labs due to infertility concerns.  I encourage her to follow-up with her OB/GYN.  - Thyroid Stimulating Hormone (TSH)  - T4, Free  - T3 (Triiodothyronine), Free  - US Thyroid; Future    3. Lipid screening  - Lipid Henry    4. Screening for diabetes mellitus  - Glycosylated Hemoglobin A1c    I enjoyed my visit with Nguyen today and look forward to seeing her as needed in the future.    Subjective:     Nguyen is a 30 y.o. female presenting to the clinic for concerns for fatigue.  Patient has felt more fatigued recently.  She has had a difficult time losing weight.  Ultrasound of her thyroid on 6/6/19 showed a 9 mm left thyroid nodule which had significantly increased in size from 12/21/2015.  The nodule did not warrant follow-up according to concensus guidelines.  She is concerned that her thyroid may not be functioning well.  She has a history of irregular menses which was treated with Provera.  When she attends zoom meetings, she feels as though her neck appears larger than others.  Her maternal grandmother had thyroid disease.  Patient is also trying to conceive for 4 years and has been unable to do so.  She would like to make sure that her thyroid is not contributing to the problem.    Review of Systems: A complete 14 point review of systems was obtained and is negative or as stated in the history of present illness.    Social History     Socioeconomic History      Marital status:      Spouse name: Not on file     Number of children: Not on file     Years of education: Not on file     Highest education level: Not on file   Occupational History     Not on file   Social Needs     Financial resource strain: Not on file     Food insecurity     Worry: Not on file     Inability: Not on file     Transportation needs     Medical: Not on file     Non-medical: Not on file   Tobacco Use     Smoking status: Never Smoker     Smokeless tobacco: Never Used   Substance and Sexual Activity     Alcohol use: Yes     Frequency: Monthly or less     Comment: rare     Drug use: No     Sexual activity: Yes     Partners: Male     Comment:    Lifestyle     Physical activity     Days per week: Not on file     Minutes per session: Not on file     Stress: Not on file   Relationships     Social connections     Talks on phone: Not on file     Gets together: Not on file     Attends Church service: Not on file     Active member of club or organization: Not on file     Attends meetings of clubs or organizations: Not on file     Relationship status: Not on file     Intimate partner violence     Fear of current or ex partner: Not on file     Emotionally abused: Not on file     Physically abused: Not on file     Forced sexual activity: Not on file   Other Topics Concern     Not on file   Social History Narrative     Not on file       Active Ambulatory Problems     Diagnosis Date Noted     Dizziness 01/10/2019     Nausea 01/10/2019     Resolved Ambulatory Problems     Diagnosis Date Noted     No Resolved Ambulatory Problems     Past Medical History:   Diagnosis Date     Anemia      Anxiety        Family History   Problem Relation Age of Onset     Hyperlipidemia Mother      Skin cancer Mother      Hypertension Father      Heart disease Father      Skin cancer Maternal Grandmother      Arthritis Maternal Grandmother      Heart disease Paternal Grandmother      Diabetes Cousin        Objective:      /80 (Patient Site: Right Arm, Patient Position: Sitting, Cuff Size: Adult Regular)   Pulse 66   Wt 153 lb 11.2 oz (69.7 kg)   BMI 23.03 kg/m      Patient is alert, in no obvious distress.   Skin: Warm, dry.   Neck: Supple, thyroid appears mildly enlarged without discrete nodularities.   Lungs:  Clear to auscultation. Respirations even and unlabored.  No wheezing or rales noted.   Heart:  Regular rate and rhythm.  No murmurs, S3, S4, gallops, or rubs.    Musculoskeletal:  Full ROM of extremities.

## 2021-06-16 NOTE — TELEPHONE ENCOUNTER
RN cannot approve Refill Request    RN can NOT refill this medication med is not covered by policy/route to provider. Last office visit: 2/18/2021 Rochelle Farias CNP Last Physical: Visit date not found Last MTM visit: Visit date not found Last visit same specialty: 2/18/2021 Rochelle Farias CNP.  Next visit within 3 mo: Visit date not found  Next physical within 3 mo: Visit date not found      Kavitha العلي, [facility] Triage/Med Refill 4/6/2021    Requested Prescriptions   Pending Prescriptions Disp Refills     cyclobenzaprine (FLEXERIL) 5 MG tablet 20 tablet 0     Sig: Take 1-2 tablets (5-10 mg total) by mouth 3 (three) times a day as needed for muscle spasms.       There is no refill protocol information for this order

## 2021-06-19 NOTE — LETTER
Letter by Rochelle Farias CNP at      Author: Rochelle Farias CNP Service: -- Author Type: --    Filed:  Encounter Date: 8/15/2019 Status: (Other)         Nguyen BILL Jacob  [address] [facility] [address]      August 15, 2019      Dear Nguyen    In reviewing your records, we have determined a gap in your preventive services. Based on your age and health history, we recommend the follow service.     ? General Physical  ? Physical with a Pap Smear  ? Colon cancer screening  ? Mammogram  ? Immunization  ? Diabetic check  ? Blood pressure/cardiovascular check  ? Asthma check  ? Cholesterol test  ? Lab work  ? Med check      If you have had the service elsewhere, please contact us so we can update our records. Please let us know if you have transferred your care to another clinic.    Please call [phone] to schedule this appointment.    We believe that a strong preventive care program, including regular physicals and follow-up care is an important part of a healthy lifestyle and we are committed to helping you maintain your health.    Thank you for choosing us as your health care provider.    Sincerely,     [facility]

## 2021-06-19 NOTE — LETTER
Letter by Marco Bell DO at      Author: Marco Bell DO Service: -- Author Type: --    Filed:  Date of Service:  Status: (Other)         June 6, 2019     Patient: Nguyen Jacob   YOB: 1990   Date of Visit: 6/6/2019       To Whom it May Concern:    Nguyen Jacob was seen in my clinic on 6/6/2019. She may return to work on 6/6/2019.  Please provide an ergonomic assessment of her work station. .    If you have any questions or concerns, please don't hesitate to call.    Sincerely,         Marco Bell DO

## 2021-06-19 NOTE — LETTER
Letter by Rochelle Farias CNP at      Author: Rochelle Farias CNP Service: -- Author Type: --    Filed:  Encounter Date: 6/14/2019 Status: (Other)         Nguyen Jacob  [address] [facility] [address]             June 14, 2019         Dear Ms. Jacob,    Below are the results from your recent visit:    Resulted Orders   Basic Metabolic Panel   Result Value Ref Range    Sodium 140 136 - 145 mmol/L    Potassium 4.2 3.5 - 5.0 mmol/L    Chloride 104 98 - 107 mmol/L    CO2 28 22 - 31 mmol/L    Anion Gap, Calculation 8 5 - 18 mmol/L    Glucose 81 70 - 125 mg/dL    Calcium 10.1 8.5 - 10.5 mg/dL    BUN 11 8 - 22 mg/dL    Creatinine 0.68 0.60 - 1.10 mg/dL    GFR MDRD Af Amer >60 >60 mL/min/1.73m2    GFR MDRD Non Af Amer >60 >60 mL/min/1.73m2    Narrative    Fasting Glucose reference range is 70-99 mg/dL per  American Diabetes Association (ADA) guidelines.   Urinalysis-UC if Indicated   Result Value Ref Range    Color, UA Yellow Colorless, Yellow, Straw, Light Yellow    Clarity, UA Clear Clear    Glucose, UA Negative Negative    Bilirubin, UA Negative Negative    Ketones, UA Negative Negative    Specific Gravity, UA 1.015 1.005 - 1.030    Blood, UA Negative Negative    pH, UA 7.5 5.0 - 8.0    Protein, UA Negative Negative mg/dL    Urobilinogen, UA 0.2 E.U./dL 0.2 E.U./dL, 1.0 E.U./dL    Nitrite, UA Negative Negative    Leukocytes, UA Negative Negative    Narrative    Microscopic not indicated  UC not indicated   Thyroid Cascade   Result Value Ref Range    TSH 0.61 0.30 - 5.00 uIU/mL       Your lab results are normal.     Please call with questions or contact us using [email or web address].    Sincerely,        Electronically signed by Rochelle Farias CNP

## 2021-06-20 NOTE — LETTER
Letter by Nissen, Lynette, RN at      Author: Nissen, Lynette, RN Service: -- Author Type: --    Filed:  Encounter Date: 5/17/2020 Status: (Other)       5/17/2020        Nguyen Jacob  [address] [place] [id number]    This letter provides a written record that you were tested for COVID-19 on 5/15/20.     Your result was negative.    This means that we didnt find the virus that causes COVID-19 in your sample. A test may show negative when you do actually have the virus. This can happen when the virus is in the early stages of infection, before you feel illness symptoms.    Even if you dont have symptoms, they may still appear. For safety, its very important to follow these rules.    Keep yourself away from others (self-isolation):      Stay home. Dont go to work, school or anywhere else.     Stay in your own room (and use your own bathroom), if you can.    Stay away from others in your home. No hugging, kissing or shaking hands. No visitors.    Clean high touch surfaces often (doorknobs, counters, handles, etc.). Use a household cleaning spray or wipes.    Cover your mouth and nose with a mask, tissue or washcloth to avoid spreading germs.    Wash your hands and face often with soap and water.    Stay in self-isolation until you meet ALL of the guidelines below:    1. You have had no fever for at least 72 hours (that is 3 full days of no fever without the use of medicine that reduces fevers), AND  2. other symptoms (such as cough, shortness of breath) have gotten better, AND  3. at least 10 days have passed since your symptoms first appeared.    Going back to work  Check with your employer for any guidelines to follow for going back to work.    Employers: This document serves as formal notice that your employee tested negative for COVID-19, as of the testing date shown above.    For questions regarding this letter or your Negative COVID-19 result, call [phone] between 8A to 6:30P (M-F) and 10A to 6:30P  (weekends).

## 2021-06-21 NOTE — PROGRESS NOTES
Assessment and Plan:     1. Anxiety  Discussed treatment options.  Will increase sertraline to 50 mg daily.  Educated on its indications and side effects.  Provided referral to counseling. Recommend follow-up in one month for medication management.   - sertraline (ZOLOFT) 50 MG tablet; Take 1 tablet (50 mg total) by mouth daily.  Dispense: 90 tablet; Refill: 1  - Ambulatory referral to Psychology    2. Breast pain, left  We will obtain ultrasound for further evaluation.  Patient likely has fibrocystic breasts.  Discussed avoidance of caffeine.  - US Breast Limited (Focal) Left; Future    3. Palpitations  Patient had a normal EKG and BMP.  This may be related to underlying anxiety.  Will obtain echocardiogram for further evaluation.  Discussed Holter versus event monitor.  Patient declines these at this time.  - Echo Complete; Future    4. Irregular menses  We will rule out thyroid disease and hyperprolactinemia.  She will follow-up for early morning lab.  Recommend Dr. Abena Araujo for further evaluation of underlying infertility.  She is content with the plan.  - Prolactin; Future  - Thyroid Cascade; Future    Subjective:     Nguyen is a 28 y.o. female presenting to the clinic for multiple concerns today.  Patient is being evaluated for infertility.  She stopped her oral contraceptive in May 2017.  She felt as though her body was not reacting well and she was craving food.  She was having a menstrual period every 3 months.  She saw partners [facility] and was told she needed to start infertility treatment.  Over the past 3 months, she lost her health insurance.  Her last menstrual period was 1 month ago and over the past 6 months, her periods have been regular.  She has noticed large clots.  They have been actively trying to conceive.  They are also looking into adoption options.  She had an ultrasound performed showing one ovaria cyst therefore has not been diagnosed with polycystic ovarian syndrome.  Due to this, she  has noticed an increase in her anxiety.  She has had anxiety since adolescence.  Patient has moments where she wants to run and hide.  She does have thoughts of suicide but does not have a plan.  Her mind races.  She experiences nausea.  She feels the safest when she is in bed with her dog.  She is currently taking sertraline 25 mg daily.  Prozac worked better, but she did not feel as though sex was enjoyable when she took this.  She feels supported at home.  She does admit to added anxiety as her best friend was recently diagnosed with Hodgkin's lymphoma.  Patient was seen in August at [facility] due to palpitations.  She feels as though her heart skips beats.  She had a normal EKG and BMP.  Patient states this occurs every few days.  It causes her to take a deep breath.  Her father had CAD and a stent in place at the age of 62.  It was recommended for her to have an echocardiogram and an event monitor.  Patient has had intermittent left breast pain since college.  She had an ultrasound performed in college showing a cyst.  No biopsy was performed.  She has noticed over the past 2 months, her left breast has been more sore.  She complains of a dull intermittent ache.  She denies caffeine use.  She has not palpated any nodularities.    Review of Systems: A complete 14 point review of systems was obtained and is negative or as stated in the history of present illness.    Social History     Social History     Marital status:      Spouse name: N/A     Number of children: N/A     Years of education: N/A     Occupational History     Not on file.     Social History Main Topics     Smoking status: Never Smoker     Smokeless tobacco: Never Used     Alcohol use Not on file     Drug use: Not on file     Sexual activity: Not on file     Other Topics Concern     Not on file     Social History Narrative     No narrative on file       Active Ambulatory Problems     Diagnosis Date Noted     No Active Ambulatory Problems  "    Resolved Ambulatory Problems     Diagnosis Date Noted     No Resolved Ambulatory Problems     No Additional Past Medical History       No family history on file.    Objective:     /70  Pulse 74  Ht 5' 8.25\" (1.734 m)  Wt 155 lb 4.8 oz (70.4 kg)  LMP 11/05/2018  BMI 23.44 kg/m2    Patient is alert, in no obvious distress.   Skin: Warm, dry.  No lesions or rashes.  Skin turgor rapid return.   HEENT:  Head normocephalic, atraumatic.  Eyes normal.  Ears normal.  Nose patent, mucosa pink.  Oropharynx mucosa pink.  No lesions or tonsillar enlargement.   Neck: Supple, no lymphadenopathy.No thyromegaly.  Lungs:  Clear to auscultation. Respirations even and unlabored.  No wheezing or rales noted.   Heart:  Regular rate and rhythm.  No murmurs, S3, S4, gallops, or rubs.   Breasts:  Tenderness to palpation of her left breast at 1400.  No surrounding adenopathy.    Abdomen: Soft, nontender.  No organomegaly. Bowel sounds normoactive. No guarding or masses noted.                 "

## 2021-06-21 NOTE — LETTER
Letter by Rochelle Farias CNP at      Author: Rochelle Farias CNP Service: -- Author Type: --    Filed:  Encounter Date: 2/21/2021 Status: (Other)         Nguyen Jacob  [address] [facility] [address]             February 21, 2021         Dear Ms. Jacob,    Below are the results from your recent visit:    Resulted Orders   HM2(CBC w/o Differential)   Result Value Ref Range    WBC 6.3 4.0 - 11.0 thou/uL    RBC 4.12 3.80 - 5.40 mill/uL    Hemoglobin 12.8 12.0 - 16.0 g/dL    Hematocrit 39.1 35.0 - 47.0 %    MCV 95 80 - 100 fL    MCH 31.1 27.0 - 34.0 pg    MCHC 32.7 32.0 - 36.0 g/dL    RDW 11.4 11.0 - 14.5 %    Platelets 211 140 - 440 thou/uL    MPV 11.0 (H) 7.0 - 10.0 fL   Comprehensive Metabolic Panel   Result Value Ref Range    Sodium 139 136 - 145 mmol/L    Potassium 4.1 3.5 - 5.0 mmol/L    Chloride 103 98 - 107 mmol/L    CO2 28 22 - 31 mmol/L    Anion Gap, Calculation 8 5 - 18 mmol/L    Glucose 87 70 - 125 mg/dL    BUN 12 8 - 22 mg/dL    Creatinine 0.71 0.60 - 1.10 mg/dL    GFR MDRD Af Amer >60 >60 mL/min/1.73m2    GFR MDRD Non Af Amer >60 >60 mL/min/1.73m2    Bilirubin, Total 0.6 0.0 - 1.0 mg/dL    Calcium 9.3 8.5 - 10.5 mg/dL    Protein, Total 7.5 6.0 - 8.0 g/dL    Albumin 4.4 3.5 - 5.0 g/dL    Alkaline Phosphatase 56 45 - 120 U/L    AST 13 0 - 40 U/L    ALT <9 0 - 45 U/L    Narrative    Fasting Glucose reference range is 70-99 mg/dL per  American Diabetes Association (ADA) guidelines.   Vitamin D, Total (25-Hydroxy)   Result Value Ref Range    Vitamin D, Total (25-Hydroxy) 33.6 30.0 - 80.0 ng/mL    Narrative    Deficiency <10.0 ng/mL  Insufficiency 10.0-29.9 ng/mL  Sufficiency 30.0-80.0 ng/mL  Toxicity (possible) >100.0 ng/mL   Vitamin B12   Result Value Ref Range    Vitamin B-12 525 213 - 816 pg/mL   Thyroid Stimulating Hormone (TSH)   Result Value Ref Range    TSH 0.81 0.30 - 5.00 uIU/mL   T4, Free   Result Value Ref Range    Free T4 1.2 0.7 - 1.8 ng/dL   T3 (Triiodothyronine), Free   Result Value Ref  Range    T3, Free 3.0 1.9 - 3.9 pg/mL   Ferritin   Result Value Ref Range    Ferritin 35 10 - 130 ng/mL   Iron and Transferrin Iron Binding Capacity   Result Value Ref Range    Iron 123 42 - 175 ug/dL    Transferrin 240 212 - 360 mg/dL    Transferrin Saturation, Calculated 41 20 - 50 %    Transferrin IBC, Calculated 300 (L) 313 - 563 ug/dL   Lipid Cascade   Result Value Ref Range    Cholesterol 162 <=199 mg/dL    Triglycerides 65 <=149 mg/dL    HDL Cholesterol 53 >=50 mg/dL    LDL Calculated 96 <=129 mg/dL    Patient Fasting > 8hrs? Yes    Glycosylated Hemoglobin A1c   Result Value Ref Range    Hemoglobin A1c 5.0 <=5.6 %       Your lab results show no concerning findings.  Your thyroid labs are normal.  Your Vitamin D is on the lower end of normal, so you can increase your vitamin D supplementation to see if this assists with your energy level.      Please call with questions or contact us using [email or web address].    Sincerely,        Electronically signed by Rochelle Farias CNP

## 2021-06-22 NOTE — PROGRESS NOTES
Assessment and Plan:     1. Tremor  Heavy Metals Screen, Blood    Comprehensive Metabolic Panel    HM2(CBC w/o Differential)    Ceruloplasmin    Lyme Antibody Cascade    Thyroid Stimulating Hormone (TSH)    T4, Free    Thyroid Peroxidase Antibody    CANCELED: Thyroid Cascade   2. Anxiety  Thyroid Stimulating Hormone (TSH)    T4, Free    Thyroid Peroxidase Antibody   3. Rash  Antinuclear Antibody (JB) Cascade    Celiac(Gluten)Antibody Panel ($$$)   4. Hyperprolactinemia (H)     5. Nausea  Comprehensive Metabolic Panel    HM2(CBC w/o Differential)    Celiac(Gluten)Antibody Panel ($$$)     Nausea, tremor, hyperprolactinemia could certainly be caused by sertraline.  Will wean patient off of sertraline and have her start taking 25 mg daily for the next 2 weeks.  She has an appointment with Dr. Abena Araujo at the beginning of January.  I encouraged her to speak to Dr. Araujo of her anxiety and discuss safe treatment options as she is trying to become pregnant.  As for the tremor, will also rule out heavy metal exposure, liver disease, anemia, Anthony's disease, thyroid disease, Lyme's disease.  Pheochromocytoma can also cause a tremor.  She sees Endocrinology in January for hyperprolactinemia and infertility.  This can be evaluated then if symptoms persist.  Due to recent rash, will rule out autoimmune disease and celiac disease.  If tremor persist, will refer to neurology.  She is content with the plan.    Subjective:     Nguyen is a 28 y.o. female presenting to the clinic for multiple concerns today.  Patient is taking sertraline 50 mg daily for anxiety.  Patient states she has had a right hand tremor intermittently since mid December.  She took a video of her trying to write December 18 and there was an obvious tremor present.  Patient states she her hand is not as steady as it used to be.  She feels as though it is weak.  She has noticed an unsteady gait and loss of balance.  She does have a history of neck pain in which  she had an MRI in the past which was unremarkable.  She does admit to neck pain with stress and anxiety.  She will develop migraine headaches due to this.  She has been expeiencing nausea every morning over the past month.  This occurs prior to taking taking sertraline in the morning.  It resolves by mid afternoon.  She has found no improvement with food.  She does alternate between constipation and diarrhea.  This summer, she developed intermittent paresthesias within her feet.  It improves with change in position.  Patient has also been receiving acupuncture for infertility.  Patient could not feel the heat lamp when the acupuncturist applied it to her feet.  She noticed a rash develop after taking a bath 1 week ago.  No itching was present.  Rash was only present on her left forearm.  She has a history of a tick bite December 2017.  She was treated prophylactically with doxycycline.  After 2 weeks, she developed vomiting and was fully treated for possible Lyme's.  She states her anxiety is worsening.  She has been seeing a counselor.    Review of Systems: A complete 14 point review of systems was obtained and is negative or as stated in the history of present illness.    Social History     Socioeconomic History     Marital status:      Spouse name: Not on file     Number of children: Not on file     Years of education: Not on file     Highest education level: Not on file   Social Needs     Financial resource strain: Not on file     Food insecurity - worry: Not on file     Food insecurity - inability: Not on file     Transportation needs - medical: Not on file     Transportation needs - non-medical: Not on file   Occupational History     Not on file   Tobacco Use     Smoking status: Never Smoker     Smokeless tobacco: Never Used   Substance and Sexual Activity     Alcohol use: Yes     Comment: rare     Drug use: No     Sexual activity: Yes     Partners: Male     Comment:    Other Topics Concern      "Not on file   Social History Narrative     Not on file       Active Ambulatory Problems     Diagnosis Date Noted     No Active Ambulatory Problems     Resolved Ambulatory Problems     Diagnosis Date Noted     No Resolved Ambulatory Problems     Past Medical History:   Diagnosis Date     Anxiety        Family History   Problem Relation Age of Onset     Hyperlipidemia Mother      Skin cancer Mother      Hypertension Father      Heart disease Father      Skin cancer Maternal Grandmother      Arthritis Maternal Grandmother      Heart disease Paternal Grandmother      Diabetes Cousin        Objective:     /68   Pulse 82   Ht 5' 8.25\" (1.734 m)   Wt 156 lb 12.8 oz (71.1 kg)   LMP 12/18/2018   SpO2 99%   BMI 23.67 kg/m      Patient is alert, in no obvious distress.   Skin: Warm, dry.  No lesions or rashes.  Skin turgor rapid return.   HEENT:  Head normocephalic, atraumatic.  Eyes normal.  PERRL.  EOM's intact.  No nystagmus. Ears normal.  Nose patent, mucosa pink.  Oropharynx mucosa pink.  No lesions or tonsillar enlargement.   Neck: Supple, no lymphadenopathy. No thyromegaly.  Lungs:  Clear to auscultation. Respirations even and unlabored.  No wheezing or rales noted.   Heart:  Regular rate and rhythm.  No murmurs, S3, S4, gallops, or rubs.    Abdomen: Soft, nontender.  No organomegaly. Bowel sounds normoactive. No guarding or masses noted.   Musculoskeletal:  Full ROM of extremities.  DTRs symmetrical, sensations intact.  No obvious deformity.  Muscle strength equal +5/5. No tremor is present at rest or with movement.   Neurological:  Cranial nerves 2-12 intact.                "

## 2021-06-23 NOTE — PROGRESS NOTES
"ASSESSMENT AND PLAN:  Ngueyn Jacob 28 y.o. female is seen here on 01/10/19 for evaluation of of JB, 2.4 reference range up to 2.9. She does not have evidence of connective tissue disease such as systemic lupus erythematosus, scleroderma Sjogren's or others.  She has a variety of symptoms that are being worked up at family physician's office.  She is to return for follow-up there.  Should there be a change in her symptoms or serologic data that she would return for follow-up here in rheumatology at the discretion of her primary physician.  Diagnoses and all orders for this visit:    Dizziness    Nausea      HISTORY OF PRESENTING ILLNESS:  Nguyen Jacob, 28 y.o., female is here for evaluation of \"weakly positive\" JB.  This was done as part of extensive workup.  This was initiated because of the assembly of symptoms that she had tremulousness unilaterally, nausea, anxiety, dizziness.  The symptoms have troubled her variability since 2013 more recently.  She has cut back on antidepressant under the directions from her physician and has beginning to feel better already.  She is planning to bring it down all the way to 0.  In this background her JB was checked at 2.4 was a result, that labs reference range is up to 2.9.  She reports no stomatitis, photosensitivity, Maller area eruption.  She has not had skin rash.  At one point a single incidence where after a bath her upper extremities and turned hyperemic to the wrist.  She has pictures on the phone.  She reports no Raynaud's.  She has not had pleuritic symptoms.  She did have palpitations at one time.  These were investigated at cardiology her echocardiogram and EKG were done which she understands were normal.  She has not had joint pains swelling or stiffness.  She reports no seizure activity.  She is not been pregnant, and they are trying.  She has had for the past 4-5 years and discomfort in her right side of the neck if she were to sleep on that side.  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  She does not sleep on that side but if she happens to sleep on the right side than the whole day is ruined for her the pain can then radiate to her head, arm.  She is otherwise not troubled by this.  She is now working as a realtor.  There is no personal or family history of thyroid disorder.  No family history of lupus rheumatoid arthritis ankylosing spondylo-.  She does not smoke alcohol rarely.  Exercises regularly.   Further historical information and ADL limitations as noted in the multidimensional health assessment questionnaire attached in the EMR. Rest of the 13 system ROS is negative.     ALLERGIES:Patient has no known allergies.    PAST MEDICAL/ACTIVE PROBLEMS/MEDICATION/ FAMILY HISTORY/SOCIAL DATA:  The patient has a family history of  Past Medical History:   Diagnosis Date     Anxiety      Social History     Tobacco Use   Smoking Status Never Smoker   Smokeless Tobacco Never Used     There is no problem list on file for this patient.    Current Outpatient Medications   Medication Sig Dispense Refill     ascorbic acid, vitamin C, (VITAMIN C) 250 MG tablet Take 250 mg by mouth daily.       cholecalciferol, vitamin D3, 5,000 unit Tab Take by mouth.       multivitamin therapeutic tablet Take 1 tablet by mouth daily.       sertraline (ZOLOFT) 50 MG tablet Take 1 tablet (50 mg total) by mouth daily. 90 tablet 1     cyclobenzaprine (FLEXERIL) 5 MG tablet Take 5-10 mg by mouth.       No current facility-administered medications for this visit.        COMPREHENSIVE EXAMINATION:  Vitals:    01/10/19 1214   BP: 102/66   Patient Site: Right Arm   Patient Position: Sitting   Cuff Size: Adult Regular   Pulse: 72   Weight: 156 lb (70.8 kg)   Height: 5' 8\" (1.727 m)     A well appearing alert oriented female. Vital data as noted above. Her eyes without inflammation/scleromalacia. ENTwithout oral mucositis, thrush, nasal deformity, external ear redness, " deformity. Her neck is without lymphadenopathy and supple. Lungs normal sounds, no pleural rub. Heart auscultation normal rate, rhythm; no pericardial rub and murmurs. Abdomen soft, non tender, no organomegaly. Skin examined for heliotrope, malar area eruption, lupus pernio, periungual erythema, sclerodactyly, papules, erythema nodosum, purpura, nail pitting, onycholysis, and obvious psoriasis lesion. Neurological examination shows normal alertness, speech, facial symmetry, tone and power in upper and lower extremities, Tinel's and Phalen's at wrist and gait. The joint examination is performed for swelling, tenderness, warmth, erythema, and range of motion in the following joints: DIPs, PIPs, MCPs, wrists, first CMC's, elbows, shoulders, hips, knees, ankles, feet; spine for range of motion and paraspinal muscles for tenderness. The salient normal / abnormal findings are appended.  She has no synovitis in any of the palpable joints.  She does not have dactylitis.  She does not have sclerodactyly.  There is no Maller area eruption.  She does not have stomatitis.  There is no pericardial or pleural rub.    LAB / IMAGING DATA:  ALT   Date Value Ref Range Status   12/31/2018 9 0 - 45 U/L Final     Albumin   Date Value Ref Range Status   12/31/2018 4.2 3.5 - 5.0 g/dL Final     Creatinine   Date Value Ref Range Status   12/31/2018 0.73 0.60 - 1.10 mg/dL Final       WBC   Date Value Ref Range Status   12/31/2018 6.0 4.0 - 11.0 thou/uL Final     Hemoglobin   Date Value Ref Range Status   12/31/2018 12.9 12.0 - 16.0 g/dL Final     Platelets   Date Value Ref Range Status   12/31/2018 221 140 - 440 thou/uL Final       No results found for: JB, RF, SEDRATE

## 2021-07-03 NOTE — ADDENDUM NOTE
Addendum Note by Marco Bell DO at 6/6/2019  7:38 AM     Author: Marco Bell DO Service: -- Author Type: Physician    Filed: 6/17/2019  3:05 PM Date of Service: 6/6/2019  7:38 AM Status: Signed    : Marco Bell DO (Physician)    Encounter addended by: Marco Bell DO on: 6/17/2019  3:05 PM      Actions taken: Sign clinical note

## 2021-08-26 ENCOUNTER — OFFICE VISIT (OUTPATIENT)
Dept: FAMILY MEDICINE | Facility: CLINIC | Age: 31
End: 2021-08-26
Payer: COMMERCIAL

## 2021-08-26 VITALS
WEIGHT: 160.6 LBS | SYSTOLIC BLOOD PRESSURE: 112 MMHG | DIASTOLIC BLOOD PRESSURE: 72 MMHG | BODY MASS INDEX: 24.06 KG/M2 | OXYGEN SATURATION: 97 % | HEART RATE: 80 BPM

## 2021-08-26 DIAGNOSIS — G89.29 CHRONIC NECK PAIN: Primary | ICD-10-CM

## 2021-08-26 DIAGNOSIS — M54.2 CHRONIC NECK PAIN: Primary | ICD-10-CM

## 2021-08-26 PROCEDURE — 99213 OFFICE O/P EST LOW 20 MIN: CPT | Performed by: NURSE PRACTITIONER

## 2021-08-26 RX ORDER — CYCLOBENZAPRINE HCL 5 MG
5-10 TABLET ORAL
COMMUNITY
Start: 2021-04-09 | End: 2023-04-13

## 2021-08-26 RX ORDER — ACETAMINOPHEN 325 MG/1
325-650 TABLET ORAL
COMMUNITY

## 2021-08-26 RX ORDER — CYCLOBENZAPRINE HCL 5 MG
5-10 TABLET ORAL 3 TIMES DAILY PRN
Qty: 30 TABLET | Refills: 3 | Status: SHIPPED | OUTPATIENT
Start: 2021-08-26 | End: 2021-12-09
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
## 2021-09-01 DIAGNOSIS — Z11.59 ENCOUNTER FOR SCREENING FOR OTHER VIRAL DISEASES: ICD-10-CM

## 2021-09-07 ENCOUNTER — LAB (OUTPATIENT)
Dept: FAMILY MEDICINE | Facility: CLINIC | Age: 31
End: 2021-09-07
Attending: OBSTETRICS & GYNECOLOGY
Payer: COMMERCIAL

## 2021-09-07 DIAGNOSIS — Z11.59 ENCOUNTER FOR SCREENING FOR OTHER VIRAL DISEASES: ICD-10-CM

## 2021-09-07 PROCEDURE — U0005 INFEC AGEN DETEC AMPLI PROBE: HCPCS

## 2021-09-07 PROCEDURE — U0003 INFECTIOUS AGENT DETECTION BY NUCLEIC ACID (DNA OR RNA); SEVERE ACUTE RESPIRATORY SYNDROME CORONAVIRUS 2 (SARS-COV-2) (CORONAVIRUS DISEASE [COVID-19]), AMPLIFIED PROBE TECHNIQUE, MAKING USE OF HIGH THROUGHPUT TECHNOLOGIES AS DESCRIBED BY CMS-2020-01-R: HCPCS

## 2021-09-08 LAB — SARS-COV-2 RNA RESP QL NAA+PROBE: NEGATIVE

## 2021-09-09 ENCOUNTER — HOSPITAL ENCOUNTER (OUTPATIENT)
Dept: RADIOLOGY | Facility: CLINIC | Age: 31
Discharge: HOME | End: 2021-09-09
Attending: OBSTETRICS & GYNECOLOGY | Admitting: RADIOLOGY
Payer: COMMERCIAL

## 2021-09-09 DIAGNOSIS — Z31.69 INFERTILITY COUNSELING: ICD-10-CM

## 2021-09-09 PROCEDURE — 74740 X-RAY FEMALE GENITAL TRACT: CPT

## 2021-09-09 PROCEDURE — 255N000002 HC RX 255 OP 636: Performed by: OBSTETRICS & GYNECOLOGY

## 2021-09-09 PROCEDURE — 58340 CATHETER FOR HYSTEROGRAPHY: CPT

## 2021-09-09 RX ORDER — IOPAMIDOL 612 MG/ML
4 INJECTION, SOLUTION INTRAVASCULAR ONCE
Status: COMPLETED | OUTPATIENT
Start: 2021-09-09 | End: 2021-09-09

## 2021-09-09 RX ADMIN — IOPAMIDOL 4 ML: 612 INJECTION, SOLUTION INTRAVENOUS at 08:37

## 2021-12-09 ENCOUNTER — OFFICE VISIT (OUTPATIENT)
Dept: FAMILY MEDICINE | Facility: CLINIC | Age: 31
End: 2021-12-09
Payer: COMMERCIAL

## 2021-12-09 VITALS
SYSTOLIC BLOOD PRESSURE: 110 MMHG | WEIGHT: 157.2 LBS | HEART RATE: 66 BPM | BODY MASS INDEX: 23.28 KG/M2 | HEIGHT: 69 IN | DIASTOLIC BLOOD PRESSURE: 60 MMHG

## 2021-12-09 DIAGNOSIS — Z13.1 DIABETES MELLITUS SCREENING: ICD-10-CM

## 2021-12-09 DIAGNOSIS — E04.1 THYROID NODULE: ICD-10-CM

## 2021-12-09 DIAGNOSIS — Z00.00 ENCOUNTER FOR ROUTINE HISTORY AND PHYSICAL EXAM IN FEMALE: Primary | ICD-10-CM

## 2021-12-09 DIAGNOSIS — G89.29 CHRONIC NECK PAIN: ICD-10-CM

## 2021-12-09 DIAGNOSIS — M54.2 CHRONIC NECK PAIN: ICD-10-CM

## 2021-12-09 DIAGNOSIS — E55.9 VITAMIN D DEFICIENCY: ICD-10-CM

## 2021-12-09 DIAGNOSIS — F41.9 ANXIETY: ICD-10-CM

## 2021-12-09 DIAGNOSIS — Z13.220 LIPID SCREENING: ICD-10-CM

## 2021-12-09 LAB
CHOLEST SERPL-MCNC: 167 MG/DL
FASTING STATUS PATIENT QL REPORTED: NORMAL
FASTING STATUS PATIENT QL REPORTED: NORMAL
GLUCOSE BLD-MCNC: 76 MG/DL (ref 70–125)
HDLC SERPL-MCNC: 53 MG/DL
HGB BLD-MCNC: 12.1 G/DL (ref 11.7–15.7)
LDLC SERPL CALC-MCNC: 100 MG/DL
TRIGL SERPL-MCNC: 70 MG/DL
TSH SERPL DL<=0.005 MIU/L-ACNC: 1.04 UIU/ML (ref 0.3–5)

## 2021-12-09 PROCEDURE — 82947 ASSAY GLUCOSE BLOOD QUANT: CPT | Performed by: NURSE PRACTITIONER

## 2021-12-09 PROCEDURE — 36415 COLL VENOUS BLD VENIPUNCTURE: CPT | Performed by: NURSE PRACTITIONER

## 2021-12-09 PROCEDURE — 87624 HPV HI-RISK TYP POOLED RSLT: CPT | Performed by: NURSE PRACTITIONER

## 2021-12-09 PROCEDURE — 80061 LIPID PANEL: CPT | Performed by: NURSE PRACTITIONER

## 2021-12-09 PROCEDURE — 85018 HEMOGLOBIN: CPT | Performed by: NURSE PRACTITIONER

## 2021-12-09 PROCEDURE — 84443 ASSAY THYROID STIM HORMONE: CPT | Performed by: NURSE PRACTITIONER

## 2021-12-09 PROCEDURE — 99395 PREV VISIT EST AGE 18-39: CPT | Performed by: NURSE PRACTITIONER

## 2021-12-09 PROCEDURE — G0123 SCREEN CERV/VAG THIN LAYER: HCPCS | Performed by: NURSE PRACTITIONER

## 2021-12-09 PROCEDURE — 82306 VITAMIN D 25 HYDROXY: CPT | Performed by: NURSE PRACTITIONER

## 2021-12-09 ASSESSMENT — MIFFLIN-ST. JEOR: SCORE: 1492.43

## 2021-12-09 NOTE — PROGRESS NOTES
Assessment and Plan:    Encounter for routine history and physical exam in female  Recommend consuming a healthy diet and exercising.  She declines HIV and hepatitis C screening.  She would like to wait to receive the tetanus vaccine as she received her Covid booster last week.  I completed the paperwork for an adoption.  - Hemoglobin  - Pap screen with HPV - recommended age 30 - 65 years    Lipid screening  - Lipid panel reflex to direct LDL Fasting    Diabetes mellitus screening  - Glucose    Vitamin D deficiency  She has a history of vitamin D deficiency.  She is taking 4000 units daily.  - Vitamin D Deficiency    Thyroid nodule  We will continue to monitor thyroid nodule.  We will check thyroid cascade.  - TSH with free T4 reflex    Chronic neck pain  She continues cyclobenzaprine as needed.  She has seen PT.    Anxiety  This is well controlled without medication.  She continues to see a counselor.      Subjective:     Nguyen is a 31 year old female presenting to the clinic for a female physical.     LMP: Saturday, regular once/month   Hx of abnormal pap smear: none   Last pap smear: 5/17/17  Perform self-breast exams: occasionally   Vaginal discharge or irritation: none  Sexually active: yes,  for 7 years   Contraception: none   Concerns for STDs: none   Previous pregnancies:none     Answers for HPI/ROS submitted by the patient on 12/9/2021  Frequency of exercise:: 2-3 days/week  Getting at least 3 servings of Calcium per day:: Yes  Diet:: Regular (no restrictions)  Taking medications regularly:: Not Applicable  Medication side effects:: Not applicable  Bi-annual eye exam:: Yes  Dental care twice a year:: Yes  Sleep apnea or symptoms of sleep apnea:: None  Additional concerns today:: No  Duration of exercise:: 15-30 minutes    Patient saw infertility specialist.  Her 's sperm has some abnormalities.  They are looking to adopt.  She has paperwork to be completed.  She has a history of chronic neck  pain and migraine headaches.  She takes cyclobenzaprine as needed.  She does have a history of nausea which has been evaluated in the past.  She has not needed Zofran for quite some time.  She has a history of vitamin D deficiency and takes 4000 units daily.  She has a history of an abnormal thyroid ultrasound showing the following:    IMPRESSION:     Slight increase in size of in the mixed cystic and solid nodule in the mid to low left thyroid. Category TI-RADS 2. Guidelines below do not detail any specific management or imaging follow-up.    Review of systems:  I performed a 10 point review of systems.  All pertinent positives and negatives are noted in the HPI. All others are negative.     No Known Allergies    Current Outpatient Medications   Medication     acetaminophen (TYLENOL) 325 MG tablet     cholecalciferol (VITAMIN D3) 5000 units TABS tablet     cyclobenzaprine (FLEXERIL) 5 MG tablet     ibuprofen (ADVIL/MOTRIN) 200 MG capsule     Multiple Vitamins-Minerals (DAILY MULTIVITAMIN PO)     ondansetron (ZOFRAN-ODT) 4 MG ODT tab     cyclobenzaprine (FLEXERIL) 5 MG tablet     No current facility-administered medications for this visit.       Social History     Socioeconomic History     Marital status:      Spouse name: Not on file     Number of children: Not on file     Years of education: Not on file     Highest education level: Not on file   Occupational History     Not on file   Tobacco Use     Smoking status: Never Smoker     Smokeless tobacco: Never Used   Substance and Sexual Activity     Alcohol use: Yes     Comment: Alcoholic Drinks/day: rare     Drug use: No     Sexual activity: Yes     Partners: Male     Comment:    Other Topics Concern     Parent/sibling w/ CABG, MI or angioplasty before 65F 55M? No   Social History Narrative     Not on file     Social Determinants of Health     Financial Resource Strain: Not on file   Food Insecurity: Not on file   Transportation Needs: Not on file  "  Physical Activity: Not on file   Stress: Not on file   Social Connections: Not on file   Intimate Partner Violence: Not on file   Housing Stability: Not on file       Past Medical History:   Diagnosis Date     Acne      Anemia      Anxiety      NO ACTIVE PROBLEMS        Family History   Problem Relation Age of Onset     Hypertension Father      Family History Negative Mother      Diabetes Other         paternal cousin     Hyperlipidemia Mother      Skin Cancer Mother      Heart Disease Father      Skin Cancer Maternal Grandmother      Arthritis Maternal Grandmother      Heart Disease Paternal Grandmother      Diabetes Cousin        Past Surgical History:   Procedure Laterality Date     NO HISTORY OF SURGERY       WISDOM TOOTH EXTRACTION         Objective:     /60 (BP Location: Left arm, Patient Position: Sitting, Cuff Size: Adult Regular)   Pulse 66   Ht 1.753 m (5' 9\")   Wt 71.3 kg (157 lb 3.2 oz)   BMI 23.21 kg/m      Patient is alert, no obvious distress.   Skin: Warm, dry.  No rashes or lesions. Skin turgor rapid return.   HEENT:  Eyes normal.  Ears normal.  Nose patent, mucosa pink.  Oropharynx mucosa pink, no lesions or tonsil enlargement.   Neck:  Supple, without lymphadenopathy, bruits, JVD. Thyroid normal texture and size.    Lungs:  Clear to auscultation.  No wheezing, rales noted.  Respirations even and unlabored.   Heart:  Regular rate and rhythm.  No murmurs.   Breasts:  Normal.  No surrounding adenopathy.   Abdomen: Soft, nontender.  No organomegaly.  Bowel sounds normoactive.  No guarding or masses noted.   :  External genitalia normal.  Normal vaginal mucosa.  Cervix no lesions or cervical motion tenderness.   Musculoskeletal:  Full ROM of extremities.  Muscle strength equal +5/5.   Neurological:  Cranial nerves 2-12 intact.                     "

## 2021-12-10 LAB — DEPRECATED CALCIDIOL+CALCIFEROL SERPL-MC: 37 UG/L (ref 30–80)

## 2021-12-15 LAB
HUMAN PAPILLOMA VIRUS 16 DNA: NEGATIVE
HUMAN PAPILLOMA VIRUS 18 DNA: NEGATIVE
HUMAN PAPILLOMA VIRUS FINAL DIAGNOSIS: NORMAL
HUMAN PAPILLOMA VIRUS OTHER HR: NEGATIVE

## 2021-12-20 LAB
BKR LAB AP GYN ADEQUACY: NORMAL
BKR LAB AP GYN INTERPRETATION: NORMAL
BKR LAB AP HPV REFLEX: NORMAL
BKR LAB AP PREVIOUS ABNORMAL: NORMAL
PATH REPORT.COMMENTS IMP SPEC: NORMAL
PATH REPORT.COMMENTS IMP SPEC: NORMAL
PATH REPORT.RELEVANT HX SPEC: NORMAL

## 2022-01-12 VITALS — BODY MASS INDEX: 23.55 KG/M2 | WEIGHT: 159 LBS | HEIGHT: 69 IN

## 2022-01-17 ENCOUNTER — VIRTUAL VISIT (OUTPATIENT)
Dept: FAMILY MEDICINE | Facility: CLINIC | Age: 32
End: 2022-01-17
Payer: COMMERCIAL

## 2022-01-17 DIAGNOSIS — R21 RASH: Primary | ICD-10-CM

## 2022-01-17 DIAGNOSIS — R04.0 EPISTAXIS: ICD-10-CM

## 2022-01-17 PROCEDURE — 99213 OFFICE O/P EST LOW 20 MIN: CPT | Mod: GT | Performed by: NURSE PRACTITIONER

## 2022-01-17 RX ORDER — CLOTRIMAZOLE AND BETAMETHASONE DIPROPIONATE 10; .64 MG/G; MG/G
CREAM TOPICAL 2 TIMES DAILY
Qty: 45 G | Refills: 3 | Status: SHIPPED | OUTPATIENT
Start: 2022-01-17 | End: 2023-04-13

## 2022-01-17 NOTE — PROGRESS NOTES
Nguyen is a 31 year old who is being evaluated via a billable video visit.      How would you like to obtain your AVS? MyChart  If the video visit is dropped, the invitation should be resent by: Text to cell phone: [phone]  Will anyone else be joining your video visit? No      Video Start Time: 7:39 AM    Rash  Differentials include tinea, eczema, psoriasis, contact dermatitis.  Will treat with Lotrisone cream.  Educated on its indications and side effects.  If no improvement in symptoms, patient plans on seeing her dermatologist.  - clotrimazole-betamethasone (LOTRISONE) 1-0.05 % external cream  Dispense: 45 g; Refill: 3    Epistaxis  Recommend using over-the-counter nasal saline sprays for moisturization.  Will refer to ENT.  - Otolaryngology Referral        Subjective   Nguyen is a 31 year old who presents for the following health issues   Patient has multiple concerns today.  She has been experiencing epistaxis daily for 1 month.  Patient states she has been working from home and using a space heater.  She is now using humidifier and applying Vaseline daily to her nose.  Due to the nosebleeds, she has had some postnasal drainage.  She has not had hemoptysis.  She denies any recent cold symptoms or fever.  Nosebleeds last for approximately 5 to 10 minutes.  She has noticed a large clots.  Secondly, patient has a rash present on her right lower extremity.  This has been present for 1 year.  Patient states that is oval-shaped, dry and scaly, and mildly erythematous.  It is pruritic and has increased in size.  She has tried some over-the-counter eczema cream with minimal relief.    Review of Systems   Constitutional, HEENT, cardiovascular, pulmonary, gi and gu systems are negative, except as otherwise noted.      Objective           Vitals:  No vitals were obtained today due to virtual visit.    Physical Exam   GENERAL: Healthy, alert and no distress  EYES: Eyes grossly normal to inspection.  No discharge or  erythema, or obvious scleral/conjunctival abnormalities.  HENT: Normal cephalic/atraumatic.  External ears, nose and mouth without ulcers or lesions.  No nasal drainage visible.  RESP: No audible wheeze, cough, or visible cyanosis.  No visible retractions or increased work of breathing.    SKIN: She has an oval shaped mildly erythematous rash present within her right lower extremity.  This appears to be on her shin.  Exam limited due to video quality.  NEURO: Cranial nerves grossly intact.  Mentation and speech appropriate for age.  PSYCH: Mentation appears normal, affect normal/bright, judgement and insight intact, normal speech and appearance well-groomed.          Video-Visit Details    Type of service:  Video Visit    Video End Time:7:45 AM    Originating Location (pt. Location): Home    Distant Location (provider location):  [facility]     Platform used for Video Visit: [employer]anza

## 2022-01-18 VITALS
OXYGEN SATURATION: 99 % | HEART RATE: 60 BPM | SYSTOLIC BLOOD PRESSURE: 108 MMHG | BODY MASS INDEX: 23.55 KG/M2 | HEIGHT: 69 IN | WEIGHT: 159 LBS | SYSTOLIC BLOOD PRESSURE: 126 MMHG | HEART RATE: 84 BPM | OXYGEN SATURATION: 99 % | WEIGHT: 158.2 LBS | DIASTOLIC BLOOD PRESSURE: 62 MMHG | BODY MASS INDEX: 24.05 KG/M2 | DIASTOLIC BLOOD PRESSURE: 72 MMHG

## 2022-01-18 VITALS
DIASTOLIC BLOOD PRESSURE: 80 MMHG | WEIGHT: 153.7 LBS | BODY MASS INDEX: 23.03 KG/M2 | SYSTOLIC BLOOD PRESSURE: 112 MMHG | HEART RATE: 66 BPM

## 2022-01-18 ASSESSMENT — PATIENT HEALTH QUESTIONNAIRE - PHQ9: SUM OF ALL RESPONSES TO PHQ QUESTIONS 1-9: 0

## 2022-07-22 ENCOUNTER — OFFICE VISIT (OUTPATIENT)
Dept: FAMILY MEDICINE | Facility: CLINIC | Age: 32
End: 2022-07-22
Payer: COMMERCIAL

## 2022-07-22 VITALS
OXYGEN SATURATION: 99 % | WEIGHT: 150.7 LBS | BODY MASS INDEX: 22.25 KG/M2 | RESPIRATION RATE: 25 BRPM | SYSTOLIC BLOOD PRESSURE: 110 MMHG | HEART RATE: 70 BPM | DIASTOLIC BLOOD PRESSURE: 62 MMHG | TEMPERATURE: 98.1 F

## 2022-07-22 DIAGNOSIS — E04.1 THYROID NODULE: ICD-10-CM

## 2022-07-22 DIAGNOSIS — Z13.1 DIABETES MELLITUS SCREENING: ICD-10-CM

## 2022-07-22 DIAGNOSIS — Z00.00 HEALTH CARE MAINTENANCE: ICD-10-CM

## 2022-07-22 DIAGNOSIS — G43.809 OTHER MIGRAINE WITHOUT STATUS MIGRAINOSUS, NOT INTRACTABLE: ICD-10-CM

## 2022-07-22 DIAGNOSIS — M54.2 NECK PAIN: Primary | ICD-10-CM

## 2022-07-22 LAB — HBA1C MFR BLD: 5.1 % (ref 0–5.6)

## 2022-07-22 PROCEDURE — 36415 COLL VENOUS BLD VENIPUNCTURE: CPT | Performed by: NURSE PRACTITIONER

## 2022-07-22 PROCEDURE — 84443 ASSAY THYROID STIM HORMONE: CPT | Performed by: NURSE PRACTITIONER

## 2022-07-22 PROCEDURE — 90714 TD VACC NO PRESV 7 YRS+ IM: CPT | Performed by: NURSE PRACTITIONER

## 2022-07-22 PROCEDURE — 84439 ASSAY OF FREE THYROXINE: CPT | Performed by: NURSE PRACTITIONER

## 2022-07-22 PROCEDURE — 99214 OFFICE O/P EST MOD 30 MIN: CPT | Mod: 25 | Performed by: NURSE PRACTITIONER

## 2022-07-22 PROCEDURE — 90471 IMMUNIZATION ADMIN: CPT | Performed by: NURSE PRACTITIONER

## 2022-07-22 PROCEDURE — 83036 HEMOGLOBIN GLYCOSYLATED A1C: CPT | Performed by: NURSE PRACTITIONER

## 2022-07-22 RX ORDER — METHYLPREDNISOLONE 4 MG
TABLET, DOSE PACK ORAL
Qty: 21 TABLET | Refills: 0 | Status: SHIPPED | OUTPATIENT
Start: 2022-07-22 | End: 2023-04-13

## 2022-07-22 ASSESSMENT — PAIN SCALES - GENERAL: PAINLEVEL: MILD PAIN (2)
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
## 2022-07-23 LAB
T4 FREE SERPL-MCNC: 1.46 NG/DL (ref 0.9–1.7)
TSH SERPL DL<=0.005 MIU/L-ACNC: 0.75 UIU/ML (ref 0.3–4.2)

## 2022-08-10 ENCOUNTER — HOSPITAL ENCOUNTER (OUTPATIENT)
Dept: ULTRASOUND IMAGING | Facility: CLINIC | Age: 32
Discharge: HOME | End: 2022-08-10
Attending: NURSE PRACTITIONER | Admitting: NURSE PRACTITIONER
Payer: COMMERCIAL

## 2022-08-10 DIAGNOSIS — E04.1 THYROID NODULE: ICD-10-CM

## 2022-08-10 PROCEDURE — 76536 US EXAM OF HEAD AND NECK: CPT

## 2022-09-19 ENCOUNTER — TRANSFERRED RECORDS (OUTPATIENT)
Dept: HEALTH INFORMATION MANAGEMENT | Facility: CLINIC | Age: 32
End: 2022-09-19

## 2022-10-03 ENCOUNTER — TRANSFERRED RECORDS (OUTPATIENT)
Dept: HEALTH INFORMATION MANAGEMENT | Facility: CLINIC | Age: 32
End: 2022-10-03

## 2022-10-15 ENCOUNTER — HEALTH MAINTENANCE LETTER (OUTPATIENT)
Age: 32
End: 2022-10-15

## 2022-10-27 ENCOUNTER — TRANSFERRED RECORDS (OUTPATIENT)
Dept: HEALTH INFORMATION MANAGEMENT | Facility: CLINIC | Age: 32
End: 2022-10-27

## 2023-01-17 ENCOUNTER — TRANSFERRED RECORDS (OUTPATIENT)
Dept: HEALTH INFORMATION MANAGEMENT | Facility: CLINIC | Age: 33
End: 2023-01-17

## 2023-03-08 ENCOUNTER — OFFICE VISIT (OUTPATIENT)
Dept: URGENT CARE | Facility: URGENT CARE | Age: 33
End: 2023-03-08
Payer: COMMERCIAL

## 2023-03-08 VITALS
WEIGHT: 150 LBS | OXYGEN SATURATION: 100 % | DIASTOLIC BLOOD PRESSURE: 93 MMHG | HEART RATE: 55 BPM | SYSTOLIC BLOOD PRESSURE: 151 MMHG | BODY MASS INDEX: 22.15 KG/M2 | RESPIRATION RATE: 15 BRPM | TEMPERATURE: 98.3 F

## 2023-03-08 DIAGNOSIS — R11.0 NAUSEA: ICD-10-CM

## 2023-03-08 DIAGNOSIS — K21.9 GASTROESOPHAGEAL REFLUX DISEASE, UNSPECIFIED WHETHER ESOPHAGITIS PRESENT: Primary | ICD-10-CM

## 2023-03-08 LAB
ALBUMIN SERPL BCG-MCNC: 4.9 G/DL (ref 3.5–5.2)
ALP SERPL-CCNC: 57 U/L (ref 35–104)
ALT SERPL W P-5'-P-CCNC: 7 U/L (ref 10–35)
ANION GAP SERPL CALCULATED.3IONS-SCNC: 14 MMOL/L (ref 7–15)
AST SERPL W P-5'-P-CCNC: 17 U/L (ref 10–35)
BILIRUB SERPL-MCNC: 0.4 MG/DL
BUN SERPL-MCNC: 6.7 MG/DL (ref 6–20)
CALCIUM SERPL-MCNC: 10.3 MG/DL (ref 8.6–10)
CHLORIDE SERPL-SCNC: 104 MMOL/L (ref 98–107)
CREAT SERPL-MCNC: 0.68 MG/DL (ref 0.51–0.95)
DEPRECATED HCO3 PLAS-SCNC: 25 MMOL/L (ref 22–29)
ERYTHROCYTE [DISTWIDTH] IN BLOOD BY AUTOMATED COUNT: 11.9 % (ref 10–15)
GFR SERPL CREATININE-BSD FRML MDRD: >90 ML/MIN/1.73M2
GLUCOSE SERPL-MCNC: 95 MG/DL (ref 70–99)
HCT VFR BLD AUTO: 41 % (ref 35–47)
HGB BLD-MCNC: 13.2 G/DL (ref 11.7–15.7)
LIPASE SERPL-CCNC: 23 U/L (ref 13–60)
MCH RBC QN AUTO: 30.5 PG (ref 26.5–33)
MCHC RBC AUTO-ENTMCNC: 32.2 G/DL (ref 31.5–36.5)
MCV RBC AUTO: 95 FL (ref 78–100)
PLATELET # BLD AUTO: 227 10E3/UL (ref 150–450)
POTASSIUM SERPL-SCNC: 4.4 MMOL/L (ref 3.4–5.3)
PROT SERPL-MCNC: 7.9 G/DL (ref 6.4–8.3)
RBC # BLD AUTO: 4.33 10E6/UL (ref 3.8–5.2)
SODIUM SERPL-SCNC: 143 MMOL/L (ref 136–145)
WBC # BLD AUTO: 5.8 10E3/UL (ref 4–11)

## 2023-03-08 PROCEDURE — 87338 HPYLORI STOOL AG IA: CPT | Performed by: NURSE PRACTITIONER

## 2023-03-08 PROCEDURE — 80053 COMPREHEN METABOLIC PANEL: CPT | Performed by: NURSE PRACTITIONER

## 2023-03-08 PROCEDURE — 36415 COLL VENOUS BLD VENIPUNCTURE: CPT | Performed by: NURSE PRACTITIONER

## 2023-03-08 PROCEDURE — 99214 OFFICE O/P EST MOD 30 MIN: CPT | Performed by: NURSE PRACTITIONER

## 2023-03-08 PROCEDURE — 85027 COMPLETE CBC AUTOMATED: CPT | Performed by: NURSE PRACTITIONER

## 2023-03-08 PROCEDURE — 83690 ASSAY OF LIPASE: CPT | Performed by: NURSE PRACTITIONER

## 2023-03-08 RX ORDER — TIZANIDINE 2 MG/1
0.5 TABLET ORAL
COMMUNITY
End: 2024-08-02

## 2023-03-08 RX ORDER — BACLOFEN 10 MG/1
1.5 TABLET ORAL
COMMUNITY
End: 2024-08-02

## 2023-03-08 ASSESSMENT — ENCOUNTER SYMPTOMS
COUGH: 0
FEVER: 0
VOMITING: 1
ABDOMINAL PAIN: 0
NAUSEA: 1
FATIGUE: 0

## 2023-03-08 NOTE — PROGRESS NOTES
Assessment & Plan       ICD-10-CM    1. Gastroesophageal reflux disease, unspecified whether esophagitis present  K21.9 omeprazole (PRILOSEC) 20 MG DR capsule      2. Nausea  R11.0 CBC with platelets     Comprehensive metabolic panel (BMP + Alb, Alk Phos, ALT, AST, Total. Bili, TP)     Helicobacter pylori Antigen Stool     Lipase     CBC with platelets     Comprehensive metabolic panel (BMP + Alb, Alk Phos, ALT, AST, Total. Bili, TP)     Helicobacter pylori Antigen Stool     Lipase           Patient instructions:  Your lab results will populate into [facility] when they are complete. If anything is abnormal we will also reach out with follow up information.     Please start taking omeprazole 20mg every morning for the next 2 weeks. This medication takes several days to begin working. If after the 2 weeks your symptoms have resolved you can stop taking it.     If symptoms do not improve please follow up with primary care provider. If anything worsens, you develop fever, dehydration, worsening abdominal pain, vomiting that cannot be controlled or has any blood, diarrhea with blood please go to the ER for further evaluation.         Medical decision making:  Abdomen non tender to palpation during exam. No clinical signs of appendicitis, acute abdomen, diverticulitis or other red flags. Will start patient on omeprazole for next 2 weeks to help with acid reduction. If no improvement / worsening symptoms instructed patient to follow up with primary care or higher level of care.      Will contact patient with results for labs when they become available.     No follow-ups on file.    At the end of the encounter, I discussed results, diagnosis, medications. Discussed red flags for immediate return to clinic/ER, as well as indications for follow up if no improvement. Patient understood and agreed to plan. Patient was stable for discharge.    Karthik Cedillo is a 32 year old female who presents to clinic today the following  health issues:  Chief Complaint   Patient presents with     Abdominal Pain     X 10 days, feels more acid now, gargling, no appetite during the day, having some pain      Nausea     Nauseous, burping      Pt reports 12 days of nausea and increased stomach acid. States nausea is worse in the morning and gets better as the day goes on. By dinner she is usually able to eat a meal, but not in morning or early afternoon. Pt reports she vomited once last Friday and has had some loose stools. Denies any blood in vomit or stool, no fever. Did try pepto yesterday which helped a little. Denies chance of pregancy. Hx of migraines, reports symptoms started with migraine, which as since gone away. Denies any urinary symptoms.      Nausea  The current episode started 1 to 4 weeks ago. The problem occurs constantly. The problem has been waxing and waning. Associated symptoms include nausea and vomiting (x1). Pertinent negatives include no abdominal pain, chest pain, congestion, coughing, fatigue, fever or urinary symptoms.           Review of Systems   Constitutional: Negative for fatigue and fever.   HENT: Negative for congestion.    Respiratory: Negative for cough.    Cardiovascular: Negative for chest pain.   Gastrointestinal: Positive for nausea and vomiting (x1). Negative for abdominal pain.       Problem List:  2021-12: Anxiety  2019-01: Hyperprolactinemia (H)  2013-01: Lumbago  2013-01: Pain in thoracic spine  2012-12: Adjustment disorder with anxiety  2012-12: Migraine  2010-10: CARDIOVASCULAR SCREENING; LDL GOAL LESS THAN 160      Past Medical History:   Diagnosis Date     Acne      Anemia      Anxiety      NO ACTIVE PROBLEMS        Social History     Tobacco Use     Smoking status: Never     Smokeless tobacco: Never   Substance Use Topics     Alcohol use: Yes     Comment: Alcoholic Drinks/day: rare           Objective    BP (!) 151/93 (BP Location: Right arm, Patient Position: Sitting, Cuff Size: Adult Regular)   Pulse  55   Temp 98.3  F (36.8  C) (Oral)   Resp 15   Wt 68 kg (150 lb)   SpO2 100%   BMI 22.15 kg/m    Physical Exam  Vitals reviewed.   Constitutional:       Appearance: Normal appearance.   HENT:      Head: Normocephalic and atraumatic.      Mouth/Throat:      Mouth: Mucous membranes are moist.   Abdominal:      General: Abdomen is flat. There is no distension.      Palpations: Abdomen is soft.      Tenderness: There is no abdominal tenderness. There is no guarding or rebound.   Skin:     General: Skin is warm.      Capillary Refill: Capillary refill takes less than 2 seconds.   Neurological:      General: No focal deficit present.      Mental Status: She is alert.              JOB FOSS CNP

## 2023-03-08 NOTE — PATIENT INSTRUCTIONS
Your lab results will populate into [facility] when they are complete. If anything is abnormal we will also reach out with follow up information.     Please start taking omeprazole 20mg every morning for the next 2 weeks. This medication takes several days to begin working. If after the 2 weeks your symptoms have resolved you can stop taking it.     If symptoms do not improve please follow up with primary care provider. If anything worsens, you develop fever, dehydration, worsening abdominal pain, vomiting that cannot be controlled or has any blood, diarrhea with blood please go to the ER for further evaluation.

## 2023-03-09 ENCOUNTER — APPOINTMENT (OUTPATIENT)
Dept: LAB | Facility: CLINIC | Age: 33
End: 2023-03-09
Payer: COMMERCIAL

## 2023-03-10 LAB — H PYLORI AG STL QL IA: NEGATIVE

## 2023-03-26 ENCOUNTER — HEALTH MAINTENANCE LETTER (OUTPATIENT)
Age: 33
End: 2023-03-26

## 2023-04-12 ASSESSMENT — ENCOUNTER SYMPTOMS
DYSURIA: 0
NERVOUS/ANXIOUS: 0
FREQUENCY: 0
JOINT SWELLING: 0
PALPITATIONS: 0
MYALGIAS: 1
WEAKNESS: 0
HEADACHES: 1
COUGH: 0
ABDOMINAL PAIN: 0
EYE PAIN: 0
ARTHRALGIAS: 0
SHORTNESS OF BREATH: 0
HEARTBURN: 0
NAUSEA: 0
CONSTIPATION: 0
BREAST MASS: 0
SORE THROAT: 0
CHILLS: 0
FEVER: 0
DIARRHEA: 0
DIZZINESS: 0
HEMATOCHEZIA: 0
HEMATURIA: 0
PARESTHESIAS: 0

## 2023-04-13 ENCOUNTER — OFFICE VISIT (OUTPATIENT)
Dept: FAMILY MEDICINE | Facility: CLINIC | Age: 33
End: 2023-04-13
Payer: COMMERCIAL

## 2023-04-13 VITALS
TEMPERATURE: 98.1 F | HEIGHT: 69 IN | BODY MASS INDEX: 23.24 KG/M2 | DIASTOLIC BLOOD PRESSURE: 76 MMHG | SYSTOLIC BLOOD PRESSURE: 114 MMHG | RESPIRATION RATE: 20 BRPM | OXYGEN SATURATION: 98 % | WEIGHT: 156.9 LBS | HEART RATE: 71 BPM

## 2023-04-13 DIAGNOSIS — M54.2 NECK PAIN: ICD-10-CM

## 2023-04-13 DIAGNOSIS — Z13.1 DIABETES MELLITUS SCREENING: ICD-10-CM

## 2023-04-13 DIAGNOSIS — G43.809 OTHER MIGRAINE WITHOUT STATUS MIGRAINOSUS, NOT INTRACTABLE: ICD-10-CM

## 2023-04-13 DIAGNOSIS — T78.40XA ALLERGIC REACTION, INITIAL ENCOUNTER: ICD-10-CM

## 2023-04-13 DIAGNOSIS — E04.1 THYROID NODULE: ICD-10-CM

## 2023-04-13 DIAGNOSIS — Z00.00 ENCOUNTER FOR ROUTINE HISTORY AND PHYSICAL EXAM IN FEMALE: Primary | ICD-10-CM

## 2023-04-13 DIAGNOSIS — Z13.220 LIPID SCREENING: ICD-10-CM

## 2023-04-13 DIAGNOSIS — E22.1 HYPERPROLACTINEMIA (H): ICD-10-CM

## 2023-04-13 LAB
CHOLEST SERPL-MCNC: 156 MG/DL
HBA1C MFR BLD: 4.9 % (ref 0–5.6)
HDLC SERPL-MCNC: 56 MG/DL
LDLC SERPL CALC-MCNC: 80 MG/DL
NONHDLC SERPL-MCNC: 100 MG/DL
PROLACTIN SERPL 3RD IS-MCNC: 30 NG/ML (ref 5–23)
T4 FREE SERPL-MCNC: 1.35 NG/DL (ref 0.9–1.7)
TRIGL SERPL-MCNC: 99 MG/DL
TSH SERPL DL<=0.005 MIU/L-ACNC: 0.64 UIU/ML (ref 0.3–4.2)

## 2023-04-13 PROCEDURE — 99395 PREV VISIT EST AGE 18-39: CPT | Performed by: NURSE PRACTITIONER

## 2023-04-13 PROCEDURE — 36415 COLL VENOUS BLD VENIPUNCTURE: CPT | Performed by: NURSE PRACTITIONER

## 2023-04-13 PROCEDURE — 80061 LIPID PANEL: CPT | Performed by: NURSE PRACTITIONER

## 2023-04-13 PROCEDURE — 84439 ASSAY OF FREE THYROXINE: CPT | Performed by: NURSE PRACTITIONER

## 2023-04-13 PROCEDURE — 84443 ASSAY THYROID STIM HORMONE: CPT | Performed by: NURSE PRACTITIONER

## 2023-04-13 PROCEDURE — 83036 HEMOGLOBIN GLYCOSYLATED A1C: CPT | Performed by: NURSE PRACTITIONER

## 2023-04-13 PROCEDURE — 84146 ASSAY OF PROLACTIN: CPT | Performed by: NURSE PRACTITIONER

## 2023-04-13 ASSESSMENT — PAIN SCALES - GENERAL: PAINLEVEL: NO PAIN (0)

## 2023-04-13 NOTE — PROGRESS NOTES
Assessment and Plan:    Encounter for routine history and physical exam in female  Recommend consuming a healthy diet and exercising.  She declines HIV and hepatitis C screening.      Diabetes mellitus screening  - Hemoglobin A1c    Lipid screening  - Lipid panel reflex to direct LDL Non-fasting    Thyroid nodule  We will check thyroid labs.  Anticipate repeat thyroid ultrasound in 1 to 2 years.  - TSH  - T4, free    Hyperprolactinemia (H)  We will recheck prolactin today.  - Prolactin    Allergic reaction, initial encounter  We will refer to allergist due to history of swelling with bee stings.  - Adult Allergy/Asthma Referral    Other migraine without status migrainosus, not intractable  Neck pain  Patient is seeing neurology.  She continues tizanidine and baclofen.      Subjective:     Nguyen is a 33 year old female presenting to the clinic for a female physical.     LMP: 3/26/23 regular once/month   Hx of abnormal pap smear: none   Last pap smear: 12/9/21 normal, negative HPV   Perform self-breast exams: occasionally   Vaginal discharge or irritation: none  Sexually active: yes,  for 9 years   Contraception: none   Concerns for STDs: none   Previous pregnancies:none     Patient has been seeing neurology for migraine headaches.  She is prescribed tizanidine and baclofen to assist with neck tightness.  She is completing physical therapy.  Patient is interested in seeing an allergist.  She has had some swelling in her extremities when she is stung by a bee.  She is also interested in food allergy testing.  She has a history of hyperprolactinemia.  We will check prolactin today.  She has a thyroid nodule which we are monitoring.  She is due for thyroid labs.    Review of systems:  I performed a 10 point review of systems.  All pertinent positives and negatives are noted in the HPI. All others are negative.     No Known Allergies    Current Outpatient Medications   Medication     acetaminophen (TYLENOL) 325 MG  tablet     baclofen (LIORESAL) 10 MG tablet     ibuprofen (ADVIL/MOTRIN) 200 MG capsule     tiZANidine (ZANAFLEX) 2 MG tablet     cholecalciferol (VITAMIN D3) 5000 units TABS tablet     clotrimazole-betamethasone (LOTRISONE) 1-0.05 % external cream     cyclobenzaprine (FLEXERIL) 5 MG tablet     methylPREDNISolone (MEDROL DOSEPAK) 4 MG tablet therapy pack     Multiple Vitamins-Minerals (DAILY MULTIVITAMIN PO)     ondansetron (ZOFRAN-ODT) 4 MG ODT tab     No current facility-administered medications for this visit.       Social History     Socioeconomic History     Marital status:      Spouse name: Not on file     Number of children: Not on file     Years of education: Not on file     Highest education level: Not on file   Occupational History     Not on file   Tobacco Use     Smoking status: Never     Passive exposure: Never     Smokeless tobacco: Never   Vaping Use     Vaping status: Not on file   Substance and Sexual Activity     Alcohol use: Yes     Comment: Alcoholic Drinks/day: rare     Drug use: No     Sexual activity: Yes     Partners: Male     Comment:    Other Topics Concern     Parent/sibling w/ CABG, MI or angioplasty before 65F 55M? No   Social History Narrative     Not on file     Social Determinants of Health     Financial Resource Strain: Not on file   Food Insecurity: Not on file   Transportation Needs: Not on file   Physical Activity: Not on file   Stress: Not on file   Social Connections: Not on file   Intimate Partner Violence: Not on file   Housing Stability: Not on file       Past Medical History:   Diagnosis Date     Acne      Anemia      Anxiety      NO ACTIVE PROBLEMS        Family History   Problem Relation Age of Onset     Hypertension Father      Family History Negative Mother      Diabetes Other         paternal cousin     Hyperlipidemia Mother      Skin Cancer Mother      Heart Disease Father      Skin Cancer Maternal Grandmother      Arthritis Maternal Grandmother       "Heart Disease Paternal Grandmother      Diabetes Cousin        Past Surgical History:   Procedure Laterality Date     NO HISTORY OF SURGERY       WISDOM TOOTH EXTRACTION         Objective:     /76 (BP Location: Left arm, Patient Position: Sitting, Cuff Size: Adult Regular)   Pulse 71   Temp 98.1  F (36.7  C) (Temporal)   Resp 20   Ht 1.746 m (5' 8.75\")   Wt 71.2 kg (156 lb 14.4 oz)   LMP 03/06/2023 (Exact Date)   SpO2 98%   BMI 23.34 kg/m      Patient is alert, no obvious distress.   Skin: Warm, dry.  No rashes or lesions. Skin turgor rapid return.   HEENT:  Eyes normal.  Ears normal.  Nose patent, mucosa pink.  Oropharynx mucosa pink, no lesions or tonsil enlargement.   Neck:  Supple, without lymphadenopathy, bruits, JVD. Thyroid normal texture and size.    Lungs:  Clear to auscultation.  No wheezing, rales noted.  Respirations even and unlabored.   Heart:  Regular rate and rhythm.  No murmurs.   Breasts:  Normal.  No surrounding adenopathy.   Abdomen: Soft, nontender.  No organomegaly.  Bowel sounds normoactive.  No guarding or masses noted.   :  deferred  Musculoskeletal:  Full ROM of extremities.  Muscle strength equal +5/5.   Neurological:  Cranial nerves 2-12 intact.           Answers for HPI/ROS submitted by the patient on 4/12/2023  Frequency of exercise:: 4-5 days/week  Getting at least 3 servings of Calcium per day:: Yes  Diet:: Gluten-free/reduced  Taking medications regularly:: Yes  Medication side effects:: Other  Bi-annual eye exam:: Yes  Dental care twice a year:: Yes  Sleep apnea or symptoms of sleep apnea:: Excessive snoring  abdominal pain: No  Blood in stool: No  Blood in urine: No  chest pain: No  chills: No  congestion: No  constipation: No  cough: No  diarrhea: No  dizziness: No  ear pain: No  eye pain: No  nervous/anxious: No  fever: No  frequency: No  genital sores: No  headaches: Yes  hearing loss: No  heartburn: No  arthralgias: No  joint swelling: No  peripheral edema: " No  mood changes: No  myalgias: Yes  nausea: No  dysuria: No  palpitations: No  Skin sensation changes: No  sore throat: No  urgency: No  rash: No  shortness of breath: No  visual disturbance: No  weakness: No  pelvic pain: No  vaginal bleeding: No  vaginal discharge: No  tenderness: No  breast mass: No  breast discharge: No  Additional concerns today:: Yes  Duration of exercise:: 15-30 minutes

## 2023-04-16 DIAGNOSIS — E22.1 HYPERPROLACTINEMIA (H): Primary | ICD-10-CM

## 2023-05-28 ENCOUNTER — OFFICE VISIT (OUTPATIENT)
Dept: FAMILY MEDICINE | Facility: CLINIC | Age: 33
End: 2023-05-28
Payer: COMMERCIAL

## 2023-05-28 VITALS
HEART RATE: 70 BPM | TEMPERATURE: 97 F | DIASTOLIC BLOOD PRESSURE: 84 MMHG | SYSTOLIC BLOOD PRESSURE: 133 MMHG | OXYGEN SATURATION: 98 %

## 2023-05-28 DIAGNOSIS — R07.0 THROAT PAIN: Primary | ICD-10-CM

## 2023-05-28 DIAGNOSIS — J02.9 ACUTE PHARYNGITIS, UNSPECIFIED ETIOLOGY: ICD-10-CM

## 2023-05-28 LAB
DEPRECATED S PYO AG THROAT QL EIA: NEGATIVE
GROUP A STREP BY PCR: NOT DETECTED

## 2023-05-28 PROCEDURE — 99213 OFFICE O/P EST LOW 20 MIN: CPT | Performed by: PHYSICIAN ASSISTANT

## 2023-05-28 PROCEDURE — 87651 STREP A DNA AMP PROBE: CPT | Performed by: PHYSICIAN ASSISTANT

## 2023-05-29 NOTE — PATIENT INSTRUCTIONS
Suggested increased rest increased fluids and bedside humidification  Over-the-counter Tylenol for comfort.  Follow packaging directions  Over-the-counter throat lozenges with benzocaine such as Cepacol may be used if indicated and is not a choking hazard based on age.  Follow packaging directions.  Do not overuse the benzocaine as it will dry the throat and make it uncomfortable.  Follow up with primary care provider if you do not get resolution with the course of treatment.  Return to walk-in care if complication or new symptoms arise in the interim.            Self-Care for Sore Throats  Sore throats happen for many reasons, such as colds, allergies, and infections caused by viruses or bacteria. In any case, your throat becomes red and sore. Your goal for self-care is to reduce your discomfort while giving your throat a chance to heal.    Moisten and soothe your throat  Tips include the following:  Try a sip of water first thing after waking up.  Keep your throat moist by drinking 6 or more glasses of clear liquids every day.  Run a cool-air humidifier in your room overnight.  Avoid cigarette smoke.   Suck on throat lozenges, cough drops, hard candy, ice chips, or frozen fruit-juice bars. Use the sugar-free versions if your diet or medical condition requires them.  Gargle to ease irritation  Gargling every hour or 2 can ease irritation. Try gargling with 1 of these solutions:  1/4 teaspoon of salt in 1/2 cup of warm water  An over-the-counter anesthetic gargle  Use medicine for more relief  Over-the-counter medicine can reduce sore throat symptoms. Ask your pharmacist if you have questions about which medicine to use:  Ease pain with anesthetic sprays. Aspirin or an aspirin substitute also helps. Remember, never give aspirin to anyone 18 or younger, or if you are already taking blood thinners.   For sore throats caused by allergies, try antihistamines to block the allergic reaction.  Remember: unless a sore  throat is caused by a bacterial infection, antibiotics won t help you.  Prevent future sore throats  Prevention tips include the following:  Stop smoking or reduce contact with secondhand smoke. Smoke irritates the tender throat lining.  Limit contact with pets and with allergy-causing substances, such as pollen and mold.  When you re around someone with a sore throat or cold, wash your hands often to keep viruses or bacteria from spreading.  Don t strain your vocal cords.  Call your healthcare provider  Contact your healthcare provider if you have:  A temperature over 101 F (38.3 C)  White spots on the throat  Great difficulty swallowing  Trouble breathing  A skin rash  Recent exposure to someone else with strep bacteria  Severe hoarseness and swollen glands in the neck or jaw   Date Last Reviewed: 8/1/2016 2000-2016 [employer]. [address] [place] [address], [place] [address]. All rights reserved. This information is not intended as a substitute for professional medical care. Always follow your healthcare professional's instructions.

## 2023-05-29 NOTE — PROGRESS NOTES
Patient presents with:  Pharyngitis: Sore throat since Thursday. Ibuprofen and Tylenol today      Clinical Decision Making:  Strep test was obtained and was negative.  Culture is to follow.  Symptomatic care was gone over. Expected course of resolution and indication for return was gone over and questions were answered to patient/parent's satisfaction before discharge.        ICD-10-CM    1. Throat pain  R07.0 Streptococcus A Rapid Screen w/Reflex to PCR - Clinic Collect     Group A Streptococcus PCR Throat Swab      2. Acute pharyngitis, unspecified etiology  J02.9           Patient Instructions     Suggested increased rest increased fluids and bedside humidification  Over-the-counter Tylenol for comfort.  Follow packaging directions  Over-the-counter throat lozenges with benzocaine such as Cepacol may be used if indicated and is not a choking hazard based on age.  Follow packaging directions.  Do not overuse the benzocaine as it will dry the throat and make it uncomfortable.  Follow up with primary care provider if you do not get resolution with the course of treatment.  Return to walk-in care if complication or new symptoms arise in the interim.            Self-Care for Sore Throats  Sore throats happen for many reasons, such as colds, allergies, and infections caused by viruses or bacteria. In any case, your throat becomes red and sore. Your goal for self-care is to reduce your discomfort while giving your throat a chance to heal.    Moisten and soothe your throat  Tips include the following:    Try a sip of water first thing after waking up.    Keep your throat moist by drinking 6 or more glasses of clear liquids every day.    Run a cool-air humidifier in your room overnight.    Avoid cigarette smoke.     Suck on throat lozenges, cough drops, hard candy, ice chips, or frozen fruit-juice bars. Use the sugar-free versions if your diet or medical condition requires them.  Gargle to ease irritation  Gargling every  hour or 2 can ease irritation. Try gargling with 1 of these solutions:    1/4 teaspoon of salt in 1/2 cup of warm water    An over-the-counter anesthetic gargle  Use medicine for more relief  Over-the-counter medicine can reduce sore throat symptoms. Ask your pharmacist if you have questions about which medicine to use:    Ease pain with anesthetic sprays. Aspirin or an aspirin substitute also helps. Remember, never give aspirin to anyone 18 or younger, or if you are already taking blood thinners.     For sore throats caused by allergies, try antihistamines to block the allergic reaction.    Remember: unless a sore throat is caused by a bacterial infection, antibiotics won t help you.  Prevent future sore throats  Prevention tips include the following:    Stop smoking or reduce contact with secondhand smoke. Smoke irritates the tender throat lining.    Limit contact with pets and with allergy-causing substances, such as pollen and mold.    When you re around someone with a sore throat or cold, wash your hands often to keep viruses or bacteria from spreading.    Don t strain your vocal cords.  Call your healthcare provider  Contact your healthcare provider if you have:    A temperature over 101 F (38.3 C)    White spots on the throat    Great difficulty swallowing    Trouble breathing    A skin rash    Recent exposure to someone else with strep bacteria    Severe hoarseness and swollen glands in the neck or jaw   Date Last Reviewed: 8/1/2016 2000-2016 [employer]. [address] [place] [address]. All rights reserved. This information is not intended as a substitute for professional medical care. Always follow your healthcare professional's instructions.        HPI:  Nguyen Jacob is a 33 year old female who presents today for a four day acute onset of sore throat and odynophagia.  Patient denies fever, chills, night sweats, fatigue, vomiting, diarrhea, skin rash, abdominal pain or urinary  symptoms.      No known sick contacts for strep throat.    Has tried Ibuprofen and tylenol for this over-the-counter today.    History obtained from chart review and the patient.    Problem List:  2021-12: Anxiety  2019-01: Hyperprolactinemia (H)  2013-01: Lumbago  2013-01: Pain in thoracic spine  2012-12: Adjustment disorder with anxiety  2012-12: Migraine  2010-10: CARDIOVASCULAR SCREENING; LDL GOAL LESS THAN 160      Past Medical History:   Diagnosis Date     Acne      Anemia      Anxiety      NO ACTIVE PROBLEMS        Social History     Tobacco Use     Smoking status: Never     Passive exposure: Never     Smokeless tobacco: Never   Substance Use Topics     Alcohol use: Not Currently     Comment: Alcoholic Drinks/day: rare       Review of Systems  As above in HPI otherwise negative.    Vitals:    05/28/23 1918   BP: 133/84   Pulse: 70   Temp: 97  F (36.1  C)   SpO2: 98%       General: Patient is resting comfortably no acute distress is afebrile  HEENT: Head is normocephalic atraumatic   eyes are PERRL EOMI sclera anicteric   TMs are clear bilaterally  Throat is with mild pharyngeal wall erythema and no exudate  No cervical lymphadenopathy present  LUNGS: Clear to auscultation bilaterally  HEART: Regular rate and rhythm  Skin: Without rash non-diaphoretic    Physical Exam      Labs:  Results for orders placed or performed in visit on 05/28/23   Streptococcus A Rapid Screen w/Reflex to PCR - Clinic Collect     Status: Normal    Specimen: Throat; Swab   Result Value Ref Range    Group A Strep antigen Negative Negative                               At the end of the encounter, I discussed results, diagnosis, medications. Discussed red flags for immediate return to clinic/ER, as well as indications for follow up if no improvement. Patient understood and agreed to plan. Patient was stable for discharge.

## 2023-07-26 ENCOUNTER — TELEPHONE (OUTPATIENT)
Dept: FAMILY MEDICINE | Facility: CLINIC | Age: 33
End: 2023-07-26
Payer: COMMERCIAL

## 2023-07-26 NOTE — TELEPHONE ENCOUNTER
Called patient left a message on paper work that is date 6/16/2023   Asked if she still would like to pick it up.

## 2024-02-02 ENCOUNTER — OFFICE VISIT (OUTPATIENT)
Dept: URGENT CARE | Facility: URGENT CARE | Age: 34
End: 2024-02-02
Payer: COMMERCIAL

## 2024-02-02 VITALS
TEMPERATURE: 98 F | SYSTOLIC BLOOD PRESSURE: 124 MMHG | HEART RATE: 78 BPM | OXYGEN SATURATION: 98 % | DIASTOLIC BLOOD PRESSURE: 79 MMHG | RESPIRATION RATE: 20 BRPM

## 2024-02-02 DIAGNOSIS — J03.90 ACUTE SUPPURATIVE TONSILLITIS: ICD-10-CM

## 2024-02-02 DIAGNOSIS — J02.9 SORE THROAT: Primary | ICD-10-CM

## 2024-02-02 LAB
DEPRECATED S PYO AG THROAT QL EIA: NEGATIVE
FLUAV AG SPEC QL IA: NEGATIVE
FLUBV AG SPEC QL IA: NEGATIVE
GROUP A STREP BY PCR: NOT DETECTED

## 2024-02-02 PROCEDURE — 87804 INFLUENZA ASSAY W/OPTIC: CPT | Performed by: PHYSICIAN ASSISTANT

## 2024-02-02 PROCEDURE — 87651 STREP A DNA AMP PROBE: CPT | Performed by: PHYSICIAN ASSISTANT

## 2024-02-02 PROCEDURE — 99213 OFFICE O/P EST LOW 20 MIN: CPT | Performed by: PHYSICIAN ASSISTANT

## 2024-02-02 RX ORDER — AMOXICILLIN 500 MG/1
1000 CAPSULE ORAL 2 TIMES DAILY
Qty: 40 CAPSULE | Refills: 0 | Status: SHIPPED | OUTPATIENT
Start: 2024-02-02 | End: 2024-02-12

## 2024-02-02 NOTE — PROGRESS NOTES
Assessment & Plan     1. Sore throat  - Streptococcus A Rapid Screen w/Reflex to PCR  - Group A Streptococcus PCR Throat Swab  - Influenza A & B Antigen    2. Acute suppurative tonsillitis  No evidence of PTA, RPA or deep neck space abscess, but patient does have purulent exudative tonsillitis, along with fever.  Rapid strep test and influenza testing is both negative.  I discussed with her that symptoms may be viral but given her fever and appearance of tonsils we will treat with antibiotics.  Supportive care is encouraged.  Discussed indications for follow-up such as trismus, stridor, protracted fever, vomiting etc.  - amoxicillin (AMOXIL) 500 MG capsule; Take 2 capsules (1,000 mg) by mouth 2 times daily for 10 days  Dispense: 40 capsule; Refill: 0        Return in about 5 days (around 2/7/2024), or if symptoms worsen or fail to improve.    Diagnosis and treatment plan was reviewed with patient and/or family.   We went over any labs or imaging. Discussed worsening symptoms or little to no relief despite treatment plan to follow-up with PCP or return to clinic.  Patient verbalizes understanding. All questions were addressed and answered.     Sally Baron PA-C  [facility]    CHIEF COMPLAINT:   Chief Complaint   Patient presents with    Pharyngitis     Sore throat      Karthik Cedillo is a 33 year old female who presents to clinic today for evaluation of sore throat.  Symptoms started yesterday.  She did have what she thought was a headache, along with fever.  Fever at that time was 101.  Pain is worse with swallowing.  No runny nose, congestion or cough.      Past Medical History:   Diagnosis Date    Acne     Anemia     Anxiety     NO ACTIVE PROBLEMS      Past Surgical History:   Procedure Laterality Date    NO HISTORY OF SURGERY      WISDOM TOOTH EXTRACTION       Social History     Tobacco Use    Smoking status: Never     Passive exposure: Never    Smokeless tobacco: Never    Substance Use Topics    Alcohol use: Not Currently     Comment: Alcoholic Drinks/day: rare     Current Outpatient Medications   Medication    acetaminophen (TYLENOL) 325 MG tablet    amoxicillin (AMOXIL) 500 MG capsule    baclofen (LIORESAL) 10 MG tablet    ibuprofen (ADVIL/MOTRIN) 200 MG capsule    tiZANidine (ZANAFLEX) 2 MG tablet     No current facility-administered medications for this visit.     No Known Allergies    10 point ROS of systems were all negative except for pertinent positives noted in my HPI.      Exam:   /79   Pulse 78   Temp 98  F (36.7  C) (Tympanic)   Resp 20   SpO2 98%   Constitutional: healthy, alert and no distress  Head: Normocephalic, atraumatic.  Eyes: conjunctiva clear, no drainage  ENT: TMs clear and shiny sabine, nasal mucosa pink and moist, throat erythematous with exudative swollen tonsils bilaterally. No trismus or drooling.   Neck: neck is supple, no cervical lymphadenopathy or nuchal rigidity  Cardiovascular: RRR  Respiratory: CTA bilaterally, no rhonchi or rales  Skin: no rashes  Neurologic: Speech clear, gait normal. Moves all extremities.    Results for orders placed or performed in visit on 02/02/24   Streptococcus A Rapid Screen w/Reflex to PCR     Status: Normal    Specimen: Throat; Swab   Result Value Ref Range    Group A Strep antigen Negative Negative   Influenza A & B Antigen     Status: Normal    Specimen: Nose; Swab   Result Value Ref Range    Influenza A antigen Negative Negative    Influenza B antigen Negative Negative    Narrative    Test results must be correlated with clinical data. If necessary, results should be confirmed by a molecular assay or viral culture.

## 2024-03-24 ENCOUNTER — NURSE TRIAGE (OUTPATIENT)
Dept: NURSING | Facility: CLINIC | Age: 34
End: 2024-03-24
Payer: COMMERCIAL

## 2024-03-24 NOTE — TELEPHONE ENCOUNTER
"Pt is calling with concerns of feeling sick since noon today with diarrhea and vomiting every half hour, able to keep down fluids in between vomiting. Pt have vomited 10-12 times today. Vomiting yellow/orangey Fluid.    Pt's daughter had diarrhea yesterday    Triage to home care, care advice given.    Skyla Winston RN, BSN  3/24/2024 at 6:02 PM  [facility]      Reason for Disposition   [1] SEVERE vomiting (e.g., 6 or more times/day, vomits everything) BUT [2] hydrated    Additional Information   Negative: Shock suspected (e.g., cold/pale/clammy skin, too weak to stand, low BP, rapid pulse)   Negative: Difficult to awaken or acting confused (e.g., disoriented, slurred speech)   Negative: Sounds like a life-threatening emergency to the triager   Negative: [1] Vomiting AND [2] contains red blood or black (\"coffee ground\") material  (Exception: Few red streaks in vomit that only happened once.)   Negative: Severe pain in one eye   Negative: Recent head injury (within last 3 days)   Negative: Recent abdominal injury (within last 3 days)   Negative: [1] Insulin-dependent diabetes (Type I) AND [2] glucose > 400 mg/dl (22 mmol/l)   Negative: [1] Vomiting AND [2] hernia is more painful or swollen than usual   Negative: [1] SEVERE vomiting (e.g., 6 or more times/day) AND [2] present > 8 hours (Exception: Patient sounds well, is drinking liquids, does not sound dehydrated, and vomiting has lasted less than 24 hours.)   Negative: [1] MODERATE vomiting (e.g., 3 - 5 times/day) AND [2] age > 60 years   Negative: Severe headache (e.g., excruciating)  (Exception: Similar to previous migraines.)   Negative: High-risk adult (e.g., diabetes mellitus, brain tumor, V-P shunt, hernia)   Negative: [1] Drinking very little AND [2] dehydration suspected (e.g., no urine > 12 hours, very dry mouth, very lightheaded)   Negative: Patient sounds very sick or weak to the triager   Negative: [1] Vomiting AND [2] abdomen looks much more " swollen than usual   Negative: [1] Vomiting AND [2] contains bile (green color)   Negative: [1] Constant abdominal pain AND [2] present > 2 hours   Negative: [1] Fever > 103 F (39.4 C) AND [2] not able to get the fever down using Fever Care Advice   Negative: [1] Fever > 101 F (38.3 C) AND [2] age > 60 years   Negative: [1] Fever > 100.0 F (37.8 C) AND [2] bedridden (e.g., CVA, chronic illness, recovering from surgery)   Negative: [1] Fever > 100.0 F (37.8 C) AND [2] weak immune system (e.g., HIV positive, cancer chemo, splenectomy, organ transplant, chronic steroids)   Negative: Taking any of the following medications: digoxin (Lanoxin), lithium, theophylline, phenytoin (Dilantin)   Negative: [1] MILD or MODERATE vomiting AND [2] present > 48 hours (2 days) (Exception: Mild vomiting with associated diarrhea.)   Negative: Fever present > 3 days (72 hours)   Negative: Vomiting a prescription medication   Negative: [1] MILD vomiting with diarrhea AND [2] present > 5 days   Negative: Substance use (drug use) or unhealthy alcohol use, known or suspected   Negative: Vomiting is a chronic symptom (recurrent or ongoing AND present > 4 weeks)    Protocols used: Vomiting-A-

## 2024-04-10 ENCOUNTER — OFFICE VISIT (OUTPATIENT)
Dept: FAMILY MEDICINE | Facility: CLINIC | Age: 34
End: 2024-04-10
Payer: COMMERCIAL

## 2024-04-10 ENCOUNTER — NURSE TRIAGE (OUTPATIENT)
Dept: NURSING | Facility: CLINIC | Age: 34
End: 2024-04-10
Payer: COMMERCIAL

## 2024-04-10 VITALS
OXYGEN SATURATION: 97 % | WEIGHT: 148 LBS | BODY MASS INDEX: 22.02 KG/M2 | SYSTOLIC BLOOD PRESSURE: 131 MMHG | DIASTOLIC BLOOD PRESSURE: 83 MMHG | HEART RATE: 71 BPM | RESPIRATION RATE: 16 BRPM | TEMPERATURE: 98.4 F

## 2024-04-10 DIAGNOSIS — J01.90 ACUTE SINUSITIS, RECURRENCE NOT SPECIFIED, UNSPECIFIED LOCATION: Primary | ICD-10-CM

## 2024-04-10 DIAGNOSIS — R05.1 ACUTE COUGH: ICD-10-CM

## 2024-04-10 PROCEDURE — 99213 OFFICE O/P EST LOW 20 MIN: CPT | Performed by: FAMILY MEDICINE

## 2024-04-10 RX ORDER — DOXYCYCLINE 100 MG/1
100 CAPSULE ORAL 2 TIMES DAILY
Qty: 20 CAPSULE | Refills: 0 | Status: SHIPPED | OUTPATIENT
Start: 2024-04-10 | End: 2024-04-20

## 2024-04-10 NOTE — TELEPHONE ENCOUNTER
Exposed to influenza B on March 23    Stomach flu right after.    Then cough started last Thursday.  Deep cough.    Yellow nasal drainage.  Forehead and temple pain.    Also, neck pain and maybe migraine starting. Nurtec helping some hasn't resolved her headache.    Wondering if there is more going on.    Cold and flu medication helps a little with her cough.    Per the protocol, I recommended she be seen in the office today.  Caller stated understanding and agreement.  Will go to a [facility].        Reason for Disposition   Cough with cold symptoms (e.g., runny nose, postnasal drip, throat clearing)   Patient wants to be seen    Additional Information   Negative: SEVERE difficulty breathing (e.g., struggling for each breath, speaks in single words)   Negative: Bluish (or gray) lips or face now   Negative: [1] Rapid onset of cough AND [2] has hives   Negative: Coughing started suddenly after medicine, an allergic food or bee sting   Negative: [1] Difficulty breathing AND [2] exposure to flames, smoke, or fumes   Negative: [1] Stridor AND [2] difficulty breathing   Negative: Sounds like a life-threatening emergency to the triager   Negative: Choked on object of food that could be caught in the throat   Negative: Chest pain is main symptom   Negative: [1] Previous asthma attacks AND [2] this feels like asthma attack   Negative: Cough lasts > 3 weeks   Negative: Wet cough (productive; white-yellow, yellow, green, or donovan colored sputum)   Negative: [1] Dry cough (non-productive;  no sputum or minimal clear sputum) AND [2] within 14 days of COVID-19 Exposure   Negative: Chest pain  (Exception: MILD central chest pain, present only when coughing.)   Negative: [1] MODERATE difficulty breathing (e.g., speaks in phrases, SOB even at rest, pulse 100-120) AND [2] still present when not coughing   Negative: Patient sounds very sick or weak to the triager   Negative: [1] MILD difficulty breathing (e.g., minimal/no SOB at rest, SOB  with walking, pulse <100) AND [2] still present when not coughing   Negative: [1] Coughed up blood AND [2] > 1 tablespoon (15 ml)   (Exception: Blood-tinged sputum.)   Negative: Fever > 103 F (39.4 C)   Negative: [1] Fever > 101 F (38.3 C) AND [2] age > 60 years   Negative: [1] Fever > 100.0 F (37.8 C) AND [2] bedridden (e.g., CVA, chronic illness, recovering from surgery)   Negative: [1] Fever > 100.0 F (37.8 C) AND [2] diabetes mellitus or weak immune system (e.g., HIV positive, cancer chemo, splenectomy, organ transplant, chronic steroids)   Negative: Wheezing is present   Negative: [1] Ankle swelling AND [2] swelling is increasing   Negative: SEVERE coughing spells (e.g., whooping sound after coughing, vomiting after coughing)   Negative: [1] Continuous (nonstop) coughing interferes with work or school AND [2] no improvement using cough treatment per Care Advice   Negative: Fever present > 3 days (72 hours)   Negative: [1] Fever returns after gone for over 24 hours AND [2] symptoms worse or not improved   Negative: [1] Using nasal washes and pain medicine > 24 hours AND [2] sinus pain (around cheekbone or eye) persists   Negative: Earache is present   Negative: Cough has been present for > 3 weeks   Negative: [1] Nasal discharge AND [2] present > 10 days   Negative: [1] Coughed up blood-tinged sputum AND [2] more than once   Negative: [1] Patient also has allergy symptoms (e.g., itchy eyes, clear nasal discharge, postnasal drip) AND [2] they are acting up   Negative: Taking an ACE Inhibitor medicine (e.g., benazepril / LOTENSIN, captopril / CAPOTEN, enalapril / VASOTEC, lisinopril / ZESTRIL)   Negative: Exposure to TB (Tuberculosis)   Negative: Cough   Negative: Difficult to awaken or acting confused (e.g., disoriented, slurred speech)   Negative: Weakness of the face, arm or leg on one side of the body and new-onset   Negative: Numbness of the face, arm or leg on one side of the body and new-onset   Negative:  Loss of speech or garbled speech and new-onset   Negative: Passed out (i.e., fainted, collapsed and was not responding)   Negative: Sounds like a life-threatening emergency to the triager   Negative: Followed a head injury within last 3 days   Negative: Traumatic Brain Injury (TBI) is suspected   Negative: Sinus pain of forehead and yellow or green nasal discharge   Negative: Pregnant   Negative: Unable to walk without falling   Negative: Stiff neck (can't touch chin to chest)   Negative: Possibility of carbon monoxide exposure   Negative: SEVERE headache, states 'worst headache' of life   Negative: SEVERE headache, sudden-onset (i.e., reaching maximum intensity within seconds to 1 hour)   Negative: Severe pain in one eye   Negative: Loss of vision or double vision  (Exception: Same as prior migraines.)   Negative: Patient sounds very sick or weak to the triager   Negative: Fever > 103 F (39.4 C)   Negative: Fever > 100.0 F (37.8 C) and has diabetes mellitus or a weak immune system (e.g., HIV positive, cancer chemotherapy, organ transplant, splenectomy, chronic steroids)   Negative: SEVERE headache (e.g., excruciating) and has had severe headaches before   Negative: SEVERE headache and not relieved by pain meds   Negative: SEVERE headache and vomiting   Negative: SEVERE headache and fever   Negative: New headache and weak immune system (e.g., HIV positive, cancer chemo, splenectomy, organ transplant, chronic steroids)   Negative: Fever present > 3 days (72 hours)    Protocols used: Cough - Acute Non-Productive-A-AH, Headache-A-OH  Irma BARAHONA RN [facility]

## 2024-04-10 NOTE — PROGRESS NOTES
Assessment:       Acute sinusitis, recurrence not specified, unspecified location  - doxycycline hyclate (VIBRAMYCIN) 100 MG capsule  Dispense: 20 capsule; Refill: 0    Acute cough  - doxycycline hyclate (VIBRAMYCIN) 100 MG capsule  Dispense: 20 capsule; Refill: 0         Plan:     Symptoms consistent with acute bacterial sinusitis.  Patient started on doxycycline..  Recommend decongestants and ibuprofen as well for symptoms.  Follow-up if symptoms are getting worse or not continuing to improve.      MEDICATIONS:   Orders Placed This Encounter   Medications    doxycycline hyclate (VIBRAMYCIN) 100 MG capsule     Sig: Take 1 capsule (100 mg) by mouth 2 times daily for 10 days     Dispense:  20 capsule     Refill:  0         Subjective:       34 year old female presents for evaluation of a week and a half history of nasal congestion and cough.  She thought she was getting better and woke up this morning feeling significantly worse now with pain over her right eye.  She still continues to have a very wet cough.  No shortness of breath or wheezing.  Denies ear pain or sore throat.  She is not sure if she has had any fevers.  She feels very rundown.    Patient Active Problem List   Diagnosis    CARDIOVASCULAR SCREENING; LDL GOAL LESS THAN 160    Adjustment disorder with anxiety    Migraine    Lumbago    Pain in thoracic spine    Hyperprolactinemia (H24)    Anxiety       Past Medical History:   Diagnosis Date    Acne     Anemia     Anxiety     NO ACTIVE PROBLEMS        Past Surgical History:   Procedure Laterality Date    NO HISTORY OF SURGERY      WISDOM TOOTH EXTRACTION         Current Outpatient Medications   Medication Sig Dispense Refill    acetaminophen (TYLENOL) 325 MG tablet Take 325-650 mg by mouth      doxycycline hyclate (VIBRAMYCIN) 100 MG capsule Take 1 capsule (100 mg) by mouth 2 times daily for 10 days 20 capsule 0    ibuprofen (ADVIL/MOTRIN) 200 MG capsule Take 200 mg by mouth every 4 hours as needed for  fever      baclofen (LIORESAL) 10 MG tablet 1.5 tablets (Patient not taking: Reported on 4/10/2024)      tiZANidine (ZANAFLEX) 2 MG tablet 0.5 mg (Patient not taking: Reported on 4/10/2024)       No current facility-administered medications for this visit.       No Known Allergies    Family History   Problem Relation Age of Onset    Hypertension Father     Heart Disease Father     Coronary Artery Disease Father     Family History Negative Mother     Hyperlipidemia Mother     Skin Cancer Mother     Skin Cancer Maternal Grandmother     Arthritis Maternal Grandmother     Thyroid Disease Maternal Grandmother     Heart Disease Paternal Grandmother     Diabetes Other         paternal cousin    Diabetes Cousin     Diabetes Cousin     Hypertension Other        Social History     Socioeconomic History    Marital status:      Spouse name: None    Number of children: None    Years of education: None    Highest education level: None   Tobacco Use    Smoking status: Never     Passive exposure: Never    Smokeless tobacco: Never   Substance and Sexual Activity    Alcohol use: Not Currently     Comment: Alcoholic Drinks/day: rare    Drug use: No    Sexual activity: Yes     Partners: Male     Comment:    Other Topics Concern    Parent/sibling w/ CABG, MI or angioplasty before 65F 55M? No         Review of Systems  Pertinent items are noted in HPI.      Objective:                     General Appearance:    /83 (BP Location: Right arm, Patient Position: Sitting, Cuff Size: Adult Regular)   Pulse 71   Temp 98.4  F (36.9  C) (Oral)   Resp 16   Wt 67.1 kg (148 lb)   LMP 03/26/2024 (Exact Date)   SpO2 97%   BMI 22.02 kg/m          Alert, pleasant, cooperative, no distress, appears stated age   Head:    Normocephalic, without obvious abnormality, atraumatic, frontal sinus tenderness noted above the right eyebrow.   Eyes:    Conjunctiva/corneas clear   Ears:    Normal TM's without erythema or bulging. Normal  external ear canals, both ears   Nose: No maxillary sinus tenderness.   Throat:   Lips, mucosa, and tongue normal; teeth and gums normal.  No tonsilar hypertrophy or exudate.   Neck:   Supple, symmetrical, trachea midline, no adenopathy    Lungs:   Few scattered rhonchi heard throughout her lung fields mostly on the left.  No wheezes.    Heart:    Regular rate and rhythm, S1 and S2 normal, no murmur, rub or gallop       Extremities:   Extremities normal, atraumatic, no cyanosis or edema   Skin:   Skin color, texture, turgor normal, no rashes or lesions         This note has been dictated using voice recognition software. Any grammatical or context distortions are unintentional and inherent to the software

## 2024-05-26 ENCOUNTER — HEALTH MAINTENANCE LETTER (OUTPATIENT)
Age: 34
End: 2024-05-26

## 2024-08-01 PROBLEM — R42 DIZZINESS: Status: ACTIVE | Noted: 2019-01-10

## 2024-08-01 PROBLEM — N93.9 ABNORMAL UTERINE BLEEDING (AUB): Status: ACTIVE | Noted: 2024-08-01

## 2024-08-01 PROBLEM — R93.89 THICKENED ENDOMETRIUM: Status: ACTIVE | Noted: 2024-08-01

## 2024-08-01 PROBLEM — N92.6 IRREGULAR PERIODS: Status: ACTIVE | Noted: 2024-08-01

## 2024-08-01 PROBLEM — N91.5 OLIGOMENORRHEA: Status: ACTIVE | Noted: 2024-08-01

## 2024-08-01 PROBLEM — R11.0 NAUSEA: Status: ACTIVE | Noted: 2019-01-10

## 2024-08-01 RX ORDER — RIMEGEPANT SULFATE 75 MG/75MG
TABLET, ORALLY DISINTEGRATING ORAL
COMMUNITY
Start: 2024-01-20

## 2024-08-02 ENCOUNTER — OFFICE VISIT (OUTPATIENT)
Dept: FAMILY MEDICINE | Facility: CLINIC | Age: 34
End: 2024-08-02
Payer: COMMERCIAL

## 2024-08-02 VITALS
HEIGHT: 69 IN | RESPIRATION RATE: 12 BRPM | DIASTOLIC BLOOD PRESSURE: 78 MMHG | OXYGEN SATURATION: 97 % | BODY MASS INDEX: 22.96 KG/M2 | TEMPERATURE: 97.6 F | WEIGHT: 155 LBS | HEART RATE: 68 BPM | SYSTOLIC BLOOD PRESSURE: 117 MMHG

## 2024-08-02 DIAGNOSIS — Z13.1 DIABETES MELLITUS SCREENING: ICD-10-CM

## 2024-08-02 DIAGNOSIS — Z79.899 MEDICATION MANAGEMENT: ICD-10-CM

## 2024-08-02 DIAGNOSIS — E22.1 HYPERPROLACTINEMIA (H): ICD-10-CM

## 2024-08-02 DIAGNOSIS — G43.809 OTHER MIGRAINE WITHOUT STATUS MIGRAINOSUS, NOT INTRACTABLE: ICD-10-CM

## 2024-08-02 DIAGNOSIS — Z13.220 LIPID SCREENING: ICD-10-CM

## 2024-08-02 DIAGNOSIS — Z00.00 ENCOUNTER FOR ROUTINE HISTORY AND PHYSICAL EXAM IN FEMALE: Primary | ICD-10-CM

## 2024-08-02 DIAGNOSIS — E04.1 THYROID NODULE: ICD-10-CM

## 2024-08-02 PROBLEM — R42 DIZZINESS: Status: RESOLVED | Noted: 2019-01-10 | Resolved: 2024-08-02

## 2024-08-02 PROBLEM — N91.5 OLIGOMENORRHEA: Status: RESOLVED | Noted: 2024-08-01 | Resolved: 2024-08-02

## 2024-08-02 LAB
ANION GAP SERPL CALCULATED.3IONS-SCNC: 10 MMOL/L (ref 7–15)
BUN SERPL-MCNC: 9.8 MG/DL (ref 6–20)
CALCIUM SERPL-MCNC: 8.9 MG/DL (ref 8.8–10.4)
CHLORIDE SERPL-SCNC: 105 MMOL/L (ref 98–107)
CHOLEST SERPL-MCNC: 141 MG/DL
CREAT SERPL-MCNC: 0.63 MG/DL (ref 0.51–0.95)
EGFRCR SERPLBLD CKD-EPI 2021: >90 ML/MIN/1.73M2
FASTING STATUS PATIENT QL REPORTED: NORMAL
FASTING STATUS PATIENT QL REPORTED: NORMAL
GLUCOSE SERPL-MCNC: 90 MG/DL (ref 70–99)
HBA1C MFR BLD: 5 % (ref 0–5.6)
HCO3 SERPL-SCNC: 25 MMOL/L (ref 22–29)
HDLC SERPL-MCNC: 56 MG/DL
HGB BLD-MCNC: 12.6 G/DL (ref 11.7–15.7)
LDLC SERPL CALC-MCNC: 72 MG/DL
NONHDLC SERPL-MCNC: 85 MG/DL
POTASSIUM SERPL-SCNC: 4 MMOL/L (ref 3.4–5.3)
PROLACTIN SERPL 3RD IS-MCNC: 14 NG/ML (ref 5–23)
SODIUM SERPL-SCNC: 140 MMOL/L (ref 135–145)
T4 FREE SERPL-MCNC: 1.25 NG/DL (ref 0.9–1.7)
TRIGL SERPL-MCNC: 65 MG/DL
TSH SERPL DL<=0.005 MIU/L-ACNC: 1.05 UIU/ML (ref 0.3–4.2)

## 2024-08-02 PROCEDURE — 83036 HEMOGLOBIN GLYCOSYLATED A1C: CPT | Performed by: NURSE PRACTITIONER

## 2024-08-02 PROCEDURE — 84146 ASSAY OF PROLACTIN: CPT | Performed by: NURSE PRACTITIONER

## 2024-08-02 PROCEDURE — 80061 LIPID PANEL: CPT | Performed by: NURSE PRACTITIONER

## 2024-08-02 PROCEDURE — 36415 COLL VENOUS BLD VENIPUNCTURE: CPT | Performed by: NURSE PRACTITIONER

## 2024-08-02 PROCEDURE — 84443 ASSAY THYROID STIM HORMONE: CPT | Performed by: NURSE PRACTITIONER

## 2024-08-02 PROCEDURE — 99395 PREV VISIT EST AGE 18-39: CPT | Performed by: NURSE PRACTITIONER

## 2024-08-02 PROCEDURE — 84439 ASSAY OF FREE THYROXINE: CPT | Performed by: NURSE PRACTITIONER

## 2024-08-02 PROCEDURE — 85018 HEMOGLOBIN: CPT | Performed by: NURSE PRACTITIONER

## 2024-08-02 PROCEDURE — 80048 BASIC METABOLIC PNL TOTAL CA: CPT | Performed by: NURSE PRACTITIONER

## 2024-08-02 RX ORDER — NAPROXEN SODIUM 550 MG/1
TABLET ORAL
COMMUNITY
Start: 2024-07-08

## 2024-08-02 SDOH — HEALTH STABILITY: PHYSICAL HEALTH: ON AVERAGE, HOW MANY MINUTES DO YOU ENGAGE IN EXERCISE AT THIS LEVEL?: 30 MIN

## 2024-08-02 SDOH — HEALTH STABILITY: PHYSICAL HEALTH: ON AVERAGE, HOW MANY DAYS PER WEEK DO YOU ENGAGE IN MODERATE TO STRENUOUS EXERCISE (LIKE A BRISK WALK)?: 6 DAYS

## 2024-08-02 ASSESSMENT — SOCIAL DETERMINANTS OF HEALTH (SDOH): HOW OFTEN DO YOU GET TOGETHER WITH FRIENDS OR RELATIVES?: TWICE A WEEK

## 2024-08-02 ASSESSMENT — PAIN SCALES - GENERAL: PAINLEVEL: NO PAIN (0)

## 2024-08-02 NOTE — PROGRESS NOTES
Assessment and Plan:    Encounter for routine history and physical exam in female  Recommend consuming a healthy diet and exercising.  She declines HIV and hepatitis C screening.  - Hemoglobin  - Hemoglobin    Diabetes mellitus screening  - Hemoglobin A1c  - Hemoglobin A1c    Lipid screening  - Lipid panel reflex to direct LDL Fasting  - Lipid panel reflex to direct LDL Fasting    Hyperprolactinemia (H24)  She has a history of hyperprolactinemia.  Will check prolactin level today.  - Prolactin  - Prolactin    Thyroid nodule  Will check thyroid labs.  Will obtain ultrasound to determine stability.  - US Thyroid  - TSH  - T4, free  - TSH  - T4, free    Other migraine without status migrainosus, not intractable  She is followed by neurology.  She continues Nurtec as needed.    Medication management  - Basic metabolic panel  (Ca, Cl, CO2, Creat, Gluc, K, Na, BUN)  - Basic metabolic panel  (Ca, Cl, CO2, Creat, Gluc, K, Na, BUN)      Subjective:     Nguyen is a 34 year old female presenting to the clinic for a female physical.     LMP: 7/14/24 regular once/month   Hx of abnormal pap smear: none   Last pap smear: 12/9/21 normal, negative HPV   Perform self-breast exams: occasionally   Vaginal discharge or irritation: none  Sexually active: yes,  for 10 years   Contraception: none   Concerns for STDs: none   Previous pregnancies:none   Adopted Anne (girl) who is 16 months old     Patient has been seeing neurology for migraine headaches.  She is taking Nurtec as needed.  She has a history of hyperprolactinemia.  We will check prolactin today.  She has a history of irregular menses and saw gynecology in the past.  She has a thyroid nodule which we are monitoring.  She is due for thyroid labs.  She is asymptomatic.    Review of systems:  I performed a 10 point review of systems.  All pertinent positives and negatives are noted in the HPI. All others are negative.     No Known Allergies    Current Outpatient Medications    Medication Sig Dispense Refill    acetaminophen (TYLENOL) 325 MG tablet Take 325-650 mg by mouth      Cholecalciferol (VITAMIN D-3 PO) Vitamin D3      ibuprofen (ADVIL/MOTRIN) 200 MG capsule Take 200 mg by mouth every 4 hours as needed for fever      Multiple Vitamin (MULTIVITAMIN PO) Multivitamin      naproxen sodium (ANAPROX) 550 MG tablet       NURTEC 75 MG ODT tablet       Ascorbic Acid (VITAMIN C PO) Vitamin C (Patient not taking: Reported on 8/2/2024)      baclofen (LIORESAL) 10 MG tablet 1.5 tablets (Patient not taking: Reported on 4/10/2024)      tiZANidine (ZANAFLEX) 2 MG tablet 0.5 mg (Patient not taking: Reported on 4/10/2024)       No current facility-administered medications for this visit.       Social History     Socioeconomic History    Marital status:      Spouse name: Not on file    Number of children: Not on file    Years of education: Not on file    Highest education level: Not on file   Occupational History    Not on file   Tobacco Use    Smoking status: Never     Passive exposure: Never    Smokeless tobacco: Never   Vaping Use    Vaping status: Never Used   Substance and Sexual Activity    Alcohol use: Not Currently     Comment: Alcoholic Drinks/day: rare    Drug use: No    Sexual activity: Yes     Partners: Male     Comment:    Other Topics Concern    Parent/sibling w/ CABG, MI or angioplasty before 65F 55M? No   Social History Narrative    Not on file     Social Determinants of Health     Financial Resource Strain: Low Risk  (8/2/2024)    Financial Resource Strain     Within the past 12 months, have you or your family members you live with been unable to get utilities (heat, electricity) when it was really needed?: No   Food Insecurity: Low Risk  (8/2/2024)    Food Insecurity     Within the past 12 months, did you worry that your food would run out before you got money to buy more?: No     Within the past 12 months, did the food you bought just not last and you didn t have  money to get more?: No   Transportation Needs: Low Risk  (8/2/2024)    Transportation Needs     Within the past 12 months, has lack of transportation kept you from medical appointments, getting your medicines, non-medical meetings or appointments, work, or from getting things that you need?: No   Physical Activity: Sufficiently Active (8/2/2024)    Exercise Vital Sign     Days of Exercise per Week: 6 days     Minutes of Exercise per Session: 30 min   Stress: No Stress Concern Present (8/2/2024)    [employer] - Occupational Stress Questionnaire     Feeling of Stress : Not at all   Social Connections: Unknown (8/2/2024)    Social Connection and Isolation Panel [NHANES]     Frequency of Communication with Friends and Family: Not on file     Frequency of Social Gatherings with Friends and Family: Twice a week     Attends Gnosticist Services: Not on file     Active Member of Clubs or Organizations: Not on file     Attends Club or Organization Meetings: Not on file     Marital Status: Not on file   Interpersonal Safety: Low Risk  (8/2/2024)    Interpersonal Safety     Do you feel physically and emotionally safe where you currently live?: Yes     Within the past 12 months, have you been hit, slapped, kicked or otherwise physically hurt by someone?: No     Within the past 12 months, have you been humiliated or emotionally abused in other ways by your partner or ex-partner?: No   Housing Stability: Low Risk  (8/2/2024)    Housing Stability     Do you have housing? : Yes     Are you worried about losing your housing?: No       Past Medical History:   Diagnosis Date    Acne     Anemia     Anxiety     NO ACTIVE PROBLEMS     Thyroid nodule 2/4/2015       Family History   Problem Relation Age of Onset    Hypertension Father     Heart Disease Father     Coronary Artery Disease Father     Family History Negative Mother     Hyperlipidemia Mother     Skin Cancer Mother     Skin Cancer Maternal Grandmother  "    Arthritis Maternal Grandmother     Thyroid Disease Maternal Grandmother     Heart Disease Paternal Grandmother     Diabetes Other         paternal cousin    Diabetes Cousin     Diabetes Cousin     Hypertension Other        Past Surgical History:   Procedure Laterality Date    NO HISTORY OF SURGERY      WISDOM TOOTH EXTRACTION         Objective:     /78   Pulse 68   Temp 97.6  F (36.4  C)   Resp 12   Ht 1.746 m (5' 8.75\")   Wt 70.3 kg (155 lb)   LMP 07/14/2024 (Approximate)   SpO2 97%   Breastfeeding No   BMI 23.06 kg/m      Patient is alert, no obvious distress.   Skin: Warm, dry.  No rashes or lesions. Skin turgor rapid return.   HEENT:  Eyes normal.  Ears normal.  Nose patent, mucosa pink.  Oropharynx mucosa pink, no lesions or tonsil enlargement.   Neck:  Supple, without lymphadenopathy, bruits, JVD. Thyroid normal texture and size.    Lungs:  Clear to auscultation.  No wheezing, rales noted.  Respirations even and unlabored.   Heart:  Regular rate and rhythm.  No murmurs.   Breasts:  Normal.  No surrounding adenopathy.   Abdomen: Soft, nontender.  No organomegaly.  Bowel sounds normoactive.  No guarding or masses noted.   :  deferred  Musculoskeletal:  Full ROM of extremities.  Muscle strength equal +5/5.   Neurological:  Cranial nerves 2-12 intact.              "

## 2024-08-21 ENCOUNTER — HOSPITAL ENCOUNTER (OUTPATIENT)
Dept: ULTRASOUND IMAGING | Facility: CLINIC | Age: 34
Discharge: HOME | End: 2024-08-21
Attending: NURSE PRACTITIONER | Admitting: NURSE PRACTITIONER
Payer: COMMERCIAL

## 2024-08-21 DIAGNOSIS — E04.1 THYROID NODULE: ICD-10-CM

## 2024-08-21 PROCEDURE — 76536 US EXAM OF HEAD AND NECK: CPT

## 2024-10-16 ENCOUNTER — NURSE TRIAGE (OUTPATIENT)
Dept: FAMILY MEDICINE | Facility: CLINIC | Age: 34
End: 2024-10-16
Payer: COMMERCIAL

## 2024-10-16 NOTE — TELEPHONE ENCOUNTER
It would be uncommon for this to be a nasal polyp.  This could be a firmer clump of nasal congestion/drainage.  I do not recommend further follow-up unless she is experiencing pain or nasal bleeding.  Thanks.

## 2024-10-16 NOTE — TELEPHONE ENCOUNTER
"S-(situation): Patient calling to report \"tissue-like clump\" came out of her nose when blowing nose and is looking for advice. Read online it could be a nasal polyp.     B-(background): Reports she is just getting over being sick. Also reports she has recently felt she has had nasal congestion or buildup in nose that she hasn't been able to blow out and was going to mention to PCP at appointment.     Patient is scheduled for video appointment tomorrow for unrelated concern.     A-(assessment): Patient states the clump that came from her nose is bloody and tissue-like in look and consistency. Has a firm texture. Is about 1/2 inch long. Reports her nasal airway feels more clear.    No bleeding from nose before or after object was blown out.     Patient reports she has had a headache about the last 5 days but does not today. Has had a cold.     No other symptoms noted.     R-(recommendations): Patient is wondering if this could be a nasal polyp and if any other evaluation or work up would be needed. Advised will consult with PCP and return call.     "

## 2024-10-16 NOTE — TELEPHONE ENCOUNTER
Called and relayed message from Rochelle Farias, patient verbalized understanding and has no further questions at this time.     Silvestre Esparza RN  [facility]

## 2024-10-17 ENCOUNTER — VIRTUAL VISIT (OUTPATIENT)
Dept: FAMILY MEDICINE | Facility: CLINIC | Age: 34
End: 2024-10-17
Payer: COMMERCIAL

## 2024-10-17 DIAGNOSIS — G43.809 OTHER MIGRAINE WITHOUT STATUS MIGRAINOSUS, NOT INTRACTABLE: ICD-10-CM

## 2024-10-17 DIAGNOSIS — M54.2 NECK PAIN: Primary | ICD-10-CM

## 2024-10-17 PROCEDURE — 99214 OFFICE O/P EST MOD 30 MIN: CPT | Mod: 95 | Performed by: NURSE PRACTITIONER

## 2024-10-17 NOTE — PROGRESS NOTES
"Nguyen is a 34 year old who is being evaluated via a billable video visit.          ICD-10-CM    1. Neck pain  M54.2 XR Cervical Spine 2/3 Views      2. Other migraine without status migrainosus, not intractable  G43.809         Differentials include myofascial pain, bulging or herniated disks.  X-ray ordered for further evaluation.  Further plans pending the results.  Discussed symptomatic treatment including rest, ice, stretching activities.  She continues naproxen as needed for neck pain and Nurtec as needed for migraine headaches.  She is content with the plan.    Karthik Cedillo is a 34 year old, presenting for the following health issues:    Patient presents with concerns of right posterior neck pain.  Patient has had chronic neck pain and migraine headaches.  She takes naproxen for pain.  She is also prescribed Nurtec for migraines.  She has been seeing physical therapy who noted a firm spot within the area of pain.  Physical therapist suggested an x-ray.  Pain is an intermittent ache which worsens with palpation.  She feels a constant tightness within her neck.  She denies numbness and tingling of her upper extremities.      Follow Up and Neck Pain      10/17/2024    11:33 AM   Additional Questions   Roomed by Sirisha     History of Present Illness       Headaches:   Since the patient's last clinic visit, headaches are: improved  The patient is getting headaches:  2-3 timed per month  She is not able to do normal daily activities when she has a migraine.  The patient is taking the following rescue/relief medications:  Naproxyn (Aleve) and other   Patient states \"I get some relief\" from the rescue/relief medications.   The patient is taking the following medications to prevent migraines:  No medications to prevent migraines  In the past 4 weeks, the patient has gone to an Urgent Care or Emergency Room 0 times times due to headaches.    Reason for visit:  Neck pain follow up    She eats 2-3 servings of " fruits and vegetables daily.She consumes 0 sweetened beverage(s) daily.She exercises with enough effort to increase her heart rate 30 to 60 minutes per day.  She exercises with enough effort to increase her heart rate 5 days per week.   She is taking medications regularly.         Review of Systems  Constitutional, HEENT, cardiovascular, pulmonary, GI, , musculoskeletal, neuro, skin, endocrine and psych systems are negative, except as otherwise noted.      Objective    Vitals - Patient Reported  Pain Score: No Pain (0)        Physical Exam   GENERAL: alert and no distress  EYES: Eyes grossly normal to inspection.  No discharge or erythema, or obvious scleral/conjunctival abnormalities.  RESP: No audible wheeze, cough, or visible cyanosis.    SKIN: Visible skin clear. No significant rash, abnormal pigmentation or lesions.  NEURO: Cranial nerves grossly intact.  Mentation and speech appropriate for age.  PSYCH: Appropriate affect, tone, and pace of words        Video-Visit Details    Type of service:  Video Visit   Originating Location (pt. Location): Home    Distant Location (provider location):  On-site  Platform used for Video Visit: Roberto  Signed Electronically by: JOB Presley CNP

## 2024-10-21 ENCOUNTER — ANCILLARY PROCEDURE (OUTPATIENT)
Dept: GENERAL RADIOLOGY | Facility: CLINIC | Age: 34
End: 2024-10-21
Attending: NURSE PRACTITIONER
Payer: COMMERCIAL

## 2024-10-21 DIAGNOSIS — M54.2 NECK PAIN: ICD-10-CM

## 2024-10-21 PROCEDURE — 72040 X-RAY EXAM NECK SPINE 2-3 VW: CPT | Mod: TC | Performed by: STUDENT IN AN ORGANIZED HEALTH CARE EDUCATION/TRAINING PROGRAM

## 2024-10-24 DIAGNOSIS — M54.2 NECK PAIN: Primary | ICD-10-CM

## 2024-10-25 ENCOUNTER — PATIENT OUTREACH (OUTPATIENT)
Dept: CARE COORDINATION | Facility: CLINIC | Age: 34
End: 2024-10-25
Payer: COMMERCIAL

## 2024-10-28 ENCOUNTER — PATIENT OUTREACH (OUTPATIENT)
Dept: CARE COORDINATION | Facility: CLINIC | Age: 34
End: 2024-10-28
Payer: COMMERCIAL

## 2024-12-25 ENCOUNTER — OFFICE VISIT (OUTPATIENT)
Dept: URGENT CARE | Facility: URGENT CARE | Age: 34
End: 2024-12-25
Payer: COMMERCIAL

## 2024-12-25 VITALS
HEART RATE: 80 BPM | RESPIRATION RATE: 20 BRPM | SYSTOLIC BLOOD PRESSURE: 125 MMHG | TEMPERATURE: 98.3 F | DIASTOLIC BLOOD PRESSURE: 81 MMHG | OXYGEN SATURATION: 98 %

## 2024-12-25 DIAGNOSIS — R05.1 ACUTE COUGH: ICD-10-CM

## 2024-12-25 DIAGNOSIS — R07.0 THROAT PAIN: Primary | ICD-10-CM

## 2024-12-25 DIAGNOSIS — R53.83 OTHER FATIGUE: ICD-10-CM

## 2024-12-25 DIAGNOSIS — R52 BODY ACHES: ICD-10-CM

## 2024-12-25 LAB
DEPRECATED S PYO AG THROAT QL EIA: NEGATIVE
S PYO DNA THROAT QL NAA+PROBE: NOT DETECTED

## 2024-12-25 PROCEDURE — 99213 OFFICE O/P EST LOW 20 MIN: CPT | Performed by: FAMILY MEDICINE

## 2024-12-25 PROCEDURE — 87651 STREP A DNA AMP PROBE: CPT | Performed by: FAMILY MEDICINE

## 2024-12-25 NOTE — PROGRESS NOTES
Patient presents with:  Cough: X today. Fatigue, body aches and some chills, low grade fever, throat pain with swallowing and noticed white spots. At home covid test negative couple days ago.       Clinical Decision Making:      ICD-10-CM    1. Throat pain  R07.0 Streptococcus A Rapid Screen w/Reflex to PCR - Clinic Collect     Group A Streptococcus PCR Throat Swab      2. Other fatigue  R53.83       3. Acute cough  R05.1       4. Body aches  R52         Rapid strep negative. Awaiting pcr result  Posterior oropharynx mildly erythematous with white discharge.  Monitor for now. Advised tylenol alternating with advil prn.   Traveling tomorrow to [place] so may need prescription transferred to [place] if needing prescription.     There are no Patient Instructions on file for this visit.    HPI:  Nguyen Jacob is a 34 year old female who presents today complaining of   Chief Complaint   Patient presents with    Cough     X today. Fatigue, body aches and some chills, low grade fever, throat pain with swallowing and noticed white spots. At home covid test negative couple days ago.        History obtained from the patient.    Problem List:  2024-08: Abnormal uterine bleeding (AUB)  2024-08: Irregular periods  2024-08: Oligomenorrhea  2024-08: Thickened endometrium  2021-12: Anxiety  2019-01: Hyperprolactinemia (H)  2019-01: Dizziness  2019-01: Nausea  2015-02: Thyroid nodule  2015-02: Routine physical examination  2013-01: Lumbago  2013-01: Pain in thoracic spine  2012-12: Adjustment disorder with anxiety  2012-12: Migraine  2010-10: CARDIOVASCULAR SCREENING; LDL GOAL LESS THAN 160  2007-05: Varicella      Past Medical History:   Diagnosis Date    Acne     Anemia     Anxiety     NO ACTIVE PROBLEMS     Thyroid nodule 2/4/2015       Social History     Tobacco Use    Smoking status: Never     Passive exposure: Never    Smokeless tobacco: Never   Substance Use Topics    Alcohol use: Not Currently     Comment: Alcoholic  Drinks/day: rare       Review of Systems  Constitutional, HEENT, cardiovascular, pulmonary, gi and gu systems are negative, except as otherwise noted.    Vitals:    12/25/24 0912   BP: 125/81   Pulse: 80   Resp: 20   Temp: 98.3  F (36.8  C)   TempSrc: Oral   SpO2: 98%       Physical Exam  GENERAL: healthy, alert and no distress  HEENT: TMs normal bilaterally, posterior oropharynx mildly erythematous edematous with whitish discharge on the left tonsil only.  NECK: no adenopathy  RESP: lungs clear to auscultation - no rales, rhonchi or wheezes  CV: regular rate and rhythm, normal S1 S2, no S3 or S4, no murmur, click or rub  MS: no gross musculoskeletal defects noted, no edema  NEURO: Normal strength and tone, mentation intact and speech normal  PSYCH: mentation appears normal, affect normal/bright    Results:  Results for orders placed or performed in visit on 12/25/24   Streptococcus A Rapid Screen w/Reflex to PCR - Clinic Collect     Status: Normal    Specimen: Throat; Swab   Result Value Ref Range    Group A Strep antigen Negative Negative         At the end of the encounter, I discussed results, diagnosis, medications. Discussed red flags for immediate return to clinic/ER, as well as indications for follow up if no improvement. Patient understood and agreed to plan. Patient was stable for discharge.

## 2024-12-28 ENCOUNTER — NURSE TRIAGE (OUTPATIENT)
Dept: NURSING | Facility: CLINIC | Age: 34
End: 2024-12-28

## 2024-12-28 ENCOUNTER — E-VISIT (OUTPATIENT)
Dept: URGENT CARE | Facility: CLINIC | Age: 34
End: 2024-12-28
Payer: COMMERCIAL

## 2024-12-28 DIAGNOSIS — H10.31 ACUTE BACTERIAL CONJUNCTIVITIS OF RIGHT EYE: Primary | ICD-10-CM

## 2024-12-28 RX ORDER — POLYMYXIN B SULFATE AND TRIMETHOPRIM 1; 10000 MG/ML; [USP'U]/ML
SOLUTION OPHTHALMIC
Qty: 10 ML | Refills: 0 | Status: SHIPPED | OUTPATIENT
Start: 2024-12-28 | End: 2025-01-04

## 2024-12-28 NOTE — PATIENT INSTRUCTIONS
Thank you for choosing us for your care. I have placed an order for a prescription so that you can start treatment. View your full visit summary for details by clicking on the link below. Your pharmacist will able to address any questions you may have about the medication.     If you re not feeling better within 2-3 days, please schedule an appointment.  You can schedule an appointment right here in [facility], or call [phone]  If the visit is for the same symptoms as your eVisit, we ll refund the cost of your eVisit if seen within seven days.    Pinkeye: Care Instructions  Overview     Pinkeye is redness and swelling of the eye surface and the conjunctiva (the lining of the eyelid and the covering of the white part of the eye). Pinkeye is also called conjunctivitis. Pinkeye is often caused by infection with bacteria or a virus. Dry air, allergies, smoke, and chemicals are other common causes.  Pinkeye often gets better on its own in 7 to 10 days. Antibiotics only help if the pinkeye is caused by bacteria. Pinkeye caused by infection spreads easily. If an allergy or chemical is causing pinkeye, it will not go away unless you can avoid whatever is causing it.  Follow-up care is a key part of your treatment and safety. Be sure to make and go to all appointments, and call your doctor if you are having problems. It's also a good idea to know your test results and keep a list of the medicines you take.  How can you care for yourself at home?  Wash your hands often. Always wash them before and after you treat pinkeye or touch your eyes or face.  Use moist cotton or a clean, wet cloth to remove crust. Wipe from the inside corner of the eye to the outside. Use a clean part of the cloth for each wipe.  Put cold or warm wet cloths on your eye a few times a day if the eye hurts.  Do not wear contact lenses or eye makeup until the pinkeye is gone. Throw away any eye makeup you were using when you got pinkeye. Clean your  "contacts and storage case. If you wear disposable contacts, use a new pair when your eye has cleared and it is safe to wear contacts again.  If the doctor gave you antibiotic ointment or eyedrops, use them as directed. Use the medicine for as long as instructed, even if your eye starts looking better soon. Keep the bottle tip clean, and do not let it touch the eye area.  To put in eyedrops or ointment:  Tilt your head back, and pull your lower eyelid down with one finger.  Drop or squirt the medicine inside the lower lid.  Close your eye for 30 to 60 seconds to let the drops or ointment move around.  Do not touch the ointment or dropper tip to your eyelashes or any other surface.  Do not share towels, pillows, or washcloths while you have pinkeye.  When should you call for help?   Call your doctor now or seek immediate medical care if:    You have pain in your eye, not just irritation on the surface.     You have a change in vision or loss of vision.     You have an increase in discharge from the eye.     Your eye has not started to improve or begins to get worse within 48 hours after you start using antibiotics.     Pinkeye lasts longer than 7 days.   Watch closely for changes in your health, and be sure to contact your doctor if you have any problems.  Where can you learn more?  Go to https://www.[employer].net/patiented  Enter Y392 in the search box to learn more about \"Pinkeye: Care Instructions.\"  Current as of: July 31, 2024  Content Version: 14.3    2024 [employer].   Care instructions adapted under license by your healthcare professional. If you have questions about a medical condition or this instruction, always ask your healthcare professional. [employer] disclaims any warranty or liability for your use of this information.    "

## 2024-12-29 ENCOUNTER — OFFICE VISIT (OUTPATIENT)
Dept: URGENT CARE | Facility: URGENT CARE | Age: 34
End: 2024-12-29
Payer: COMMERCIAL

## 2024-12-29 VITALS
RESPIRATION RATE: 20 BRPM | HEART RATE: 85 BPM | SYSTOLIC BLOOD PRESSURE: 130 MMHG | TEMPERATURE: 98 F | DIASTOLIC BLOOD PRESSURE: 86 MMHG | OXYGEN SATURATION: 96 %

## 2024-12-29 DIAGNOSIS — H66.91 RIGHT OTITIS MEDIA, UNSPECIFIED OTITIS MEDIA TYPE: Primary | ICD-10-CM

## 2024-12-29 PROCEDURE — 99213 OFFICE O/P EST LOW 20 MIN: CPT | Performed by: FAMILY MEDICINE

## 2024-12-29 NOTE — PROGRESS NOTES
Assessment & Plan     Right otitis media, unspecified otitis media type    - amoxicillin-clavulanate (AUGMENTIN) 875-125 MG tablet; Take 1 tablet by mouth 2 times daily for 10 days.    +AOM RIGHT- will treat with Augmentin.  Continue with rest and fluids.  Close Follow-up if no change or new or worsening sx prn.    Shelley Barajas MD  [facility]    Karthik Cedillo is a 34 year old, presenting for the following health issues:  Ear Problem (Rt ear sharp intense pain x overnight. Muffled hearing. No fever or drainage.)    HPI     Here with toddler-aged daughter Anne.  URI sx in past week- flew home from [place] last night- increased RIGHT ear pain and pressure overnight.  No fever.    Review of Systems  Constitutional, neuro, ENT, endocrine, pulmonary, cardiac, gastrointestinal, genitourinary, musculoskeletal, integument and psychiatric systems are negative, except as otherwise noted.      Objective    /86   Pulse 85   Temp 98  F (36.7  C) (Tympanic)   Resp 20   LMP 12/01/2024 (Exact Date)   SpO2 96%   There is no height or weight on file to calculate BMI.  Physical Exam   GENERAL: alert and no distress  EYES: Eyes grossly normal to inspection, PERRL and conjunctivae and sclerae normal  HENT: normal cephalic/atraumatic, right ear: erythematous, bulging membrane, and mucopurulent effusion, left ear: clear effusion, nose and mouth without ulcers or lesions, oropharynx clear, and oral mucous membranes moist  PSYCH: mentation appears normal, affect normal/bright          Signed Electronically by: Cynthia Barajas MD

## 2024-12-29 NOTE — TELEPHONE ENCOUNTER
Nurse Triage SBAR    Is this a 2nd Level Triage? NO    Situation:  Medication not at pharmacy.     Background:  Per pt, she was seen today via E-visit and is currently at pharmacy and was told that: POLYTRIM 0000-0.1 UNIT/ML-% ophthalmic solution is not at pharmacy. RN called pharmacy to speak with staff to confirm that medication was received as there is receipt confirmation of medication by pharmacy at 5:52 PM tonight in pt's chart.      Assessment:  Medication question related only.     Protocol Recommended Disposition:   Home Care On file [facility]'s pharmacy that medication was sent to, is called, phone was not answered, RN waited on hold with no answer from pharmacy. Pt is called back at 7:04 PM and confirmed that medication was received by pharmacy.  Pt verbalized no further questions at this time.     Recommendation:           Does the patient meet one of the following criteria for ADS visit consideration? 16+ years old, with an [facility] PCP     TIP  Providers, please consider if this condition is appropriate for management at one of our Acute and Diagnostic Services sites.     If patient is a good candidate, please use dotphrase <dot>triageresponse and select Refer to ADS to document.    Reason for Disposition   [1] Prescription prescribed recently is not at pharmacy AND [2] triager has access to patient's EMR AND [3] prescription is recorded in the EMR    Protocols used: Medication Question Call-A-

## 2025-01-11 ENCOUNTER — TELEPHONE (OUTPATIENT)
Dept: NURSING | Facility: CLINIC | Age: 35
End: 2025-01-11
Payer: COMMERCIAL

## 2025-01-11 NOTE — TELEPHONE ENCOUNTER
Patient calling reports she tested positive for influenza with others in her family having the same symptoms. Patient wanting to know how to get them Sneha flu. Reviewed UC vs E-visit with patient to  have family do E visits. Nothing further needed.     Vandana Jameson RN 01/11/25 8:51 AM    Health Triage Nurse Advisor

## 2025-01-16 ENCOUNTER — OFFICE VISIT (OUTPATIENT)
Dept: URGENT CARE | Facility: URGENT CARE | Age: 35
End: 2025-01-16
Payer: COMMERCIAL

## 2025-01-16 VITALS
DIASTOLIC BLOOD PRESSURE: 85 MMHG | OXYGEN SATURATION: 99 % | HEART RATE: 68 BPM | TEMPERATURE: 97.5 F | RESPIRATION RATE: 20 BRPM | SYSTOLIC BLOOD PRESSURE: 123 MMHG

## 2025-01-16 DIAGNOSIS — R09.89 RHONCHI: ICD-10-CM

## 2025-01-16 DIAGNOSIS — B96.89 ACUTE BACTERIAL BRONCHITIS: ICD-10-CM

## 2025-01-16 DIAGNOSIS — J98.01 BRONCHOSPASM: ICD-10-CM

## 2025-01-16 DIAGNOSIS — J20.8 ACUTE BACTERIAL BRONCHITIS: ICD-10-CM

## 2025-01-16 DIAGNOSIS — R05.9 COUGH, UNSPECIFIED TYPE: ICD-10-CM

## 2025-01-16 DIAGNOSIS — B96.89 BACTERIAL SINUSITIS: Primary | ICD-10-CM

## 2025-01-16 DIAGNOSIS — J32.9 BACTERIAL SINUSITIS: Primary | ICD-10-CM

## 2025-01-16 RX ORDER — ALBUTEROL SULFATE 90 UG/1
2 INHALANT RESPIRATORY (INHALATION) EVERY 6 HOURS
Qty: 8.5 G | Refills: 0 | Status: SHIPPED | OUTPATIENT
Start: 2025-01-16

## 2025-01-16 RX ORDER — BENZONATATE 200 MG/1
200 CAPSULE ORAL 3 TIMES DAILY PRN
Qty: 30 CAPSULE | Refills: 0 | Status: SHIPPED | OUTPATIENT
Start: 2025-01-16

## 2025-01-16 RX ORDER — AZITHROMYCIN 250 MG/1
TABLET, FILM COATED ORAL
Qty: 6 TABLET | Refills: 0 | Status: SHIPPED | OUTPATIENT
Start: 2025-01-16 | End: 2025-01-21

## 2025-01-16 NOTE — PROGRESS NOTES
Assessment & Plan     Bacterial sinusitis    You have been diagnosed with a bacterial sinus infection. Sinusitis can occur during a cold. It can also happen due to allergies to pollens and other particles in the air.  A sinus infection can sometimes cause fever, headache, and facial pain. There is often green or yellow fluid draining from the nose or into the back of the throat (post-nasal drip). This infection is treated with antibiotics.  Home care  Take the full course of antibiotics as instructed. Don't stop taking them, even when you feel better.  Drink plenty of water, hot tea, and other liquids as directed by the healthcare provider. This may help thin nasal mucus. It also may help your sinuses drain fluids.  Heat may help soothe painful areas of your face. Use a towel soaked in hot water. Or,  the shower and direct the warm spray onto your face. Using a vaporizer along with a menthol rub at night may also help soothe symptoms.     - amoxicillin-clavulanate (AUGMENTIN) 875-125 MG tablet  Dispense: 20 tablet; Refill: 0    Acute bacterial bronchitis    Bronchitis is inflammation of the bronchial tubes, which carry air to the lungs. The tubes swell and produce mucus, or phlegm. The mucus and inflamed bronchial tubes make you cough. You may have trouble breathing.  Most cases of bronchitis are caused by viruses but in your case we are more concerned about a bacterial infection. . Antibiotics usually are helpful in this situation to help you clear this bacterial infection.  Bronchitis usually develops rapidly and lasts about 2 to 3 weeks in otherwise healthy people.    - amoxicillin-clavulanate (AUGMENTIN) 875-125 MG tablet  Dispense: 20 tablet; Refill: 0  - azithromycin (ZITHROMAX) 250 MG tablet  Dispense: 6 tablet; Refill: 0    Bronchospasm    Albuterol for   - albuterol (PROVENTIL HFA) 108 (90 Base) MCG/ACT inhaler  Dispense: 8.5 g; Refill: 0    Cough, unspecified type    Tessalon for coughing  -  benzonatate (TESSALON) 200 MG capsule  Dispense: 30 capsule; Refill: 0    Rhonchi    Will cover for pneumonia  Augmentin and zpak to cover for pneumonia  Will get chest xray is not better         At today's visit with Nguyen Jacob , we discussed results, diagnosis, medications and formulated a plan.  We also discussed red flags for immediate return to clinic/ER, as well as indications for follow up with PCP if not improved in 3 days. Patient understood and agreed to plan. Nguyen Jacob was discharged with stable vitals and has no further questions.       No follow-ups on file.    Marco Bautista, [facility], PAHÉCTOR  [facility] Cedillo is a 34 year old female who presents to clinic today for the following health issues:  Chief Complaint   Patient presents with    Sinus Problem     Recurring sinus facial pressure since last week. Dx flu last week, sx improving. Lingering cough. Chest congestion, on/off headaches. No fever.        HPI  Review of Systems  Constitutional, HEENT, cardiovascular, pulmonary, gi and gu systems are negative, except as otherwise noted.      Objective    /85   Pulse 68   Temp 97.5  F (36.4  C) (Tympanic)   Resp 20   LMP 12/27/2024 (Exact Date)   SpO2 99%   Physical Exam   GENERAL: alert and no distress  EYES: Eyes grossly normal to inspection, PERRL and conjunctivae and sclerae normal  HENT: normal cephalic/atraumatic, ear canals and TM's normal, nose and mouth without ulcers or lesions, and sinuses: maxillary, frontal tenderness on both sides  NECK: no adenopathy, no asymmetry, masses, or scars  RESP: rhonchi bilateral and expiratory wheezes bilateral  CV: regular rate and rhythm, normal S1 S2, no S3 or S4, no murmur, click or rub, no peripheral edema  MS: no gross musculoskeletal defects noted, no edema  SKIN: no suspicious lesions or rashes  NEURO: Normal strength and tone, mentation intact and speech normal  PSYCH: mentation appears  normal, affect normal/bright

## 2025-01-28 ENCOUNTER — OFFICE VISIT (OUTPATIENT)
Dept: FAMILY MEDICINE | Facility: CLINIC | Age: 35
End: 2025-01-28
Payer: COMMERCIAL

## 2025-01-28 VITALS
TEMPERATURE: 98 F | DIASTOLIC BLOOD PRESSURE: 70 MMHG | OXYGEN SATURATION: 97 % | HEIGHT: 69 IN | RESPIRATION RATE: 18 BRPM | SYSTOLIC BLOOD PRESSURE: 109 MMHG | HEART RATE: 87 BPM | BODY MASS INDEX: 24.88 KG/M2 | WEIGHT: 168 LBS

## 2025-01-28 DIAGNOSIS — G43.809 OTHER MIGRAINE WITHOUT STATUS MIGRAINOSUS, NOT INTRACTABLE: Primary | ICD-10-CM

## 2025-01-28 DIAGNOSIS — F41.9 ANXIETY: ICD-10-CM

## 2025-01-28 PROCEDURE — 99213 OFFICE O/P EST LOW 20 MIN: CPT | Performed by: NURSE PRACTITIONER

## 2025-01-28 SDOH — HEALTH STABILITY: PHYSICAL HEALTH: ON AVERAGE, HOW MANY MINUTES DO YOU ENGAGE IN EXERCISE AT THIS LEVEL?: 30 MIN

## 2025-01-28 SDOH — HEALTH STABILITY: PHYSICAL HEALTH: ON AVERAGE, HOW MANY DAYS PER WEEK DO YOU ENGAGE IN MODERATE TO STRENUOUS EXERCISE (LIKE A BRISK WALK)?: 4 DAYS

## 2025-01-28 ASSESSMENT — SOCIAL DETERMINANTS OF HEALTH (SDOH): HOW OFTEN DO YOU GET TOGETHER WITH FRIENDS OR RELATIVES?: TWICE A WEEK

## 2025-01-28 ASSESSMENT — PAIN SCALES - GENERAL: PAINLEVEL_OUTOF10: NO PAIN (0)

## 2025-01-28 NOTE — PROGRESS NOTES
Assessment and Plan:     Other migraine without status migrainosus, not intractable  This is controlled.  She continues Nurtec as needed.    Anxiety  This is controlled without medication.  She feels well supported.  Form completed for adoption paperwork.        Subjective:     Nguyen is a 34 year old female presenting to the clinic for completion of paperwork.  Patient is completing a form for adoption.  She has a history of infertility.  This is suspected to be due to endometriosis.  She has adopted a daughter.  Patient has a history of migraine headaches.  She is followed by neurology.  She takes Nurtec as needed.  Headaches typically occur 2 times per month around her menstrual period.  She has been completing physical therapy.  She has a history of anxiety and is not currently taking medication.  She has been seeing a therapist she feels well supported.  Mood is stable.  She denies thoughts of suicide.    Reviewof Systems: A complete 14 point review of systems was obtained and is negative or as stated in the history of present illness.    Social History     Socioeconomic History    Marital status:      Spouse name: Not on file    Number of children: Not on file    Years of education: Not on file    Highest education level: Not on file   Occupational History    Not on file   Tobacco Use    Smoking status: Never     Passive exposure: Never    Smokeless tobacco: Never   Vaping Use    Vaping status: Never Used   Substance and Sexual Activity    Alcohol use: Not Currently     Comment: Alcoholic Drinks/day: rare    Drug use: No    Sexual activity: Yes     Partners: Male     Comment:    Other Topics Concern    Parent/sibling w/ CABG, MI or angioplasty before 65F 55M? No   Social History Narrative    Not on file     Social Drivers of Health     Financial Resource Strain: Low Risk  (1/28/2025)    Financial Resource Strain     Within the past 12 months, have you or your family members you live with been  unable to get utilities (heat, electricity) when it was really needed?: No   Food Insecurity: Low Risk  (1/28/2025)    Food Insecurity     Within the past 12 months, did you worry that your food would run out before you got money to buy more?: No     Within the past 12 months, did the food you bought just not last and you didn t have money to get more?: No   Transportation Needs: Low Risk  (1/28/2025)    Transportation Needs     Within the past 12 months, has lack of transportation kept you from medical appointments, getting your medicines, non-medical meetings or appointments, work, or from getting things that you need?: No   Physical Activity: Insufficiently Active (1/28/2025)    Exercise Vital Sign     Days of Exercise per Week: 4 days     Minutes of Exercise per Session: 30 min   Stress: No Stress Concern Present (1/28/2025)    [employer] - Occupational Stress Questionnaire     Feeling of Stress : Not at all   Social Connections: Unknown (1/28/2025)    Social Connection and Isolation Panel [NHANES]     Frequency of Communication with Friends and Family: Not on file     Frequency of Social Gatherings with Friends and Family: Twice a week     Attends [employer] Services: Not on file     Active Member of Clubs or Organizations: Not on file     Attends Club or Organization Meetings: Not on file     Marital Status: Not on file   Interpersonal Safety: Low Risk  (8/2/2024)    Interpersonal Safety     Do you feel physically and emotionally safe where you currently live?: Yes     Within the past 12 months, have you been hit, slapped, kicked or otherwise physically hurt by someone?: No     Within the past 12 months, have you been humiliated or emotionally abused in other ways by your partner or ex-partner?: No   Housing Stability: Low Risk  (1/28/2025)    Housing Stability     Do you have housing? : Yes     Are you worried about losing your housing?: No       Active Ambulatory Problems      "Diagnosis Date Noted    CARDIOVASCULAR SCREENING; LDL GOAL LESS THAN 160 10/31/2010    Adjustment disorder with anxiety 12/31/2012    Migraine 12/31/2012    Lumbago 01/05/2013    Pain in thoracic spine 01/05/2013    Hyperprolactinemia 01/21/2019    Anxiety 12/09/2021    Abnormal uterine bleeding (AUB) 08/01/2024    Irregular periods 08/01/2024    Nausea 01/10/2019    Thickened endometrium 08/01/2024    Thyroid nodule 02/04/2015    Varicella 05/23/2007    Contraception management 02/03/2015     Resolved Ambulatory Problems     Diagnosis Date Noted    Dizziness 01/10/2019    Oligomenorrhea 08/01/2024    Routine physical examination 02/03/2015     Past Medical History:   Diagnosis Date    Acne     Anemia     NO ACTIVE PROBLEMS        Family History   Problem Relation Age of Onset    Hypertension Father     Heart Disease Father     Coronary Artery Disease Father     Family History Negative Mother     Hyperlipidemia Mother     Skin Cancer Mother     Skin Cancer Maternal Grandmother     Arthritis Maternal Grandmother     Thyroid Disease Maternal Grandmother     Heart Disease Paternal Grandmother     Diabetes Other         paternal cousin    Diabetes Cousin     Diabetes Cousin     Hypertension Other        Objective:     /70   Pulse 87   Temp 98  F (36.7  C)   Resp 18   Ht 1.746 m (5' 8.75\")   Wt 76.2 kg (168 lb)   LMP 12/27/2024 (Exact Date)   SpO2 97%   Breastfeeding No   BMI 24.99 kg/m      Patient is alert, in no obvious distress.   Skin: Warm, dry.    Lungs:  Clear to auscultation. Respirations even and unlabored.  No wheezing or rales noted.   Heart:  Regular rate and rhythm.  No murmurs, S3, S4, gallops, or rubs.              "

## 2025-02-03 ENCOUNTER — OFFICE VISIT (OUTPATIENT)
Dept: PHYSICAL MEDICINE AND REHAB | Facility: CLINIC | Age: 35
End: 2025-02-03
Attending: NURSE PRACTITIONER
Payer: COMMERCIAL

## 2025-02-03 VITALS
HEIGHT: 68 IN | HEART RATE: 63 BPM | DIASTOLIC BLOOD PRESSURE: 76 MMHG | SYSTOLIC BLOOD PRESSURE: 137 MMHG | BODY MASS INDEX: 24.71 KG/M2 | WEIGHT: 163 LBS

## 2025-02-03 DIAGNOSIS — M54.2 NECK PAIN: ICD-10-CM

## 2025-02-03 PROCEDURE — 99204 OFFICE O/P NEW MOD 45 MIN: CPT | Performed by: NURSE PRACTITIONER

## 2025-02-03 ASSESSMENT — PAIN SCALES - GENERAL: PAINLEVEL_OUTOF10: MILD PAIN (1)

## 2025-02-03 NOTE — LETTER
2/3/2025      Nguyen Jacob  [address] [place] [id number]      Dear Colleague,    Thank you for referring your patient, Nguyen Jacob, to the [facility]. Please see a copy of my visit note below.    ASSESSMENT: Nguyen Jacob is a 34 year old female presents for consultation at the request of PCP Rochelle Farias, who presents today for new patient evaluation of :     -neck pain    Patient is neurologically intact on exam. No myelopathic or red flag symptoms.   We reviewed Nguyen's XR from 2019 and compared them to 2024. It looks like her alignment is overall improving, from a more forward posture in 2019 to an upright posture. There is slight reversal of normal cervical lordosis upper cervical region, which could be dt muscle spasm. I recommended ongoing PT to work on continue neck strengthening.  Would send robaxin rx if needed for musuclar pain for flares - does decline today. Continue naproxen as needed as well  Let me know if symptoms plateau and would consider a new cervical MRI scan for treatment planning. Follow up PRN           2/2/2025     8:29 PM   OSWESTRY DISABILITY INDEX   Count 10    Sum 6    Oswestry Score (%) 12 %        Patient-reported            Diagnoses and all orders for this visit:  Neck pain  -     Spine  Referral       PLAN:  Reviewed spine anatomy and disease process. Discussed diagnosis and treatment options with the patient today. A shared decision making model was used. The patient's values and choices were respected. The following represents what was discussed and decided upon by the provider and the patient.     -DIAGNOSTIC TESTS:  Images were personally reviewed and interpreted and explained to patient today using spine model.   --none new    -PHYSICAL THERAPY:    -continue PT  Discussed the importance of core strengthening, ROM, stretching exercises with the patient and how each of these entities is important in decreasing pain.   Explained to the patient that the purpose of physical therapy is to teach the patient a home exercise program.  These exercises need to be performed every day in order to decrease pain and prevent future occurrences of pain.    -MEDICATIONS:    Discussed multiple medication options today with patient. Did not wish to pursue today    -INTERVENTIONS:    -Did not discuss the role of injections with patient today. Patient would be a good candidate in the future for either epidural steroid injections or medial branch blocks if indicated based on symptoms and supported by MRI imaging results.    -PATIENT EDUCATION: Total time of 40 minutes, on the day of service, spent with the patient, reviewing the chart, placing orders, and documenting.   -Today we also discussed the issues related to the pros and cons of the current treatment plan.    -FOLLOW-UP:  prn    Advised patient to call the Spine Center if symptoms worsen or if they develop red flag symptoms such as numbness, weakness, severe pain uncontrolled by current pain med regimen, or any new or worsening problems controlling bladder and bowel function.   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 Pain level varies. 1/10 today, 6 at worst, 0 at best. Suboccipital areas with tenderness. If aggravated it extends over the ears. Feels dull. Consistent enough. It flares up with lifting and using the wrong muscles for example (wont realize it at the time), sitting in the car for a long time. Feels like she needs to lie down once arriing after a long car trip. Standing is better, but if she has to stand up straight posture for a wedding for example,  it feels worse.  No arm pain, numbness, weakness or trouble with dexterity. Very occasionally will notice a little imbalance but not consistent.   Stable trouble opening jars.    Frontal areas  with intermittent dizziness, phonophobia, photophobia.  Vertigo for now is stable, but was flaring up around xmas time while she had an illness. She was treated with antibiotics recently 1/16/25.    Previously seen Dr Bell in 2019 - was referred for MRA head/neck, MR brain, xr cervical spine flex ex. [facility] PT. Last cspine MRI 2015    She is currently in PT and feels this is really working well. They are doing dry needling with stimulation. She is very satisfied with her current physical therapist. She was previously in PT and was told there was nothing more they could offer, and switched to a different provider.    She had side effects with baclofen and tizanidine and was not able to tell really if they were helping with the pain.     She uses nurtec (for migraines) and naproxen and tylenol with some relief.     She is not positive if she tried the elavil. She has tried flexeril in the past    She would prefer to avoid medications and any surgery     -Treatment to Date:     -Medications:    Current Outpatient Medications   Medication Sig Dispense Refill     acetaminophen (TYLENOL) 325 MG tablet Take 325-650 mg by mouth       Cholecalciferol (VITAMIN D-3 PO) Vitamin D3       ibuprofen (ADVIL/MOTRIN) 200 MG capsule Take 200 mg by mouth every 4 hours as needed for fever       Multiple Vitamin (MULTIVITAMIN PO) Multivitamin       naproxen sodium (ANAPROX) 550 MG tablet        NURTEC 75 MG ODT tablet        No current facility-administered medications for this visit.       No Known Allergies    Past Medical History:   Diagnosis Date     Acne      Anemia      Anxiety      NO ACTIVE PROBLEMS      Thyroid nodule 2/4/2015        Patient Active Problem List   Diagnosis     CARDIOVASCULAR SCREENING; LDL GOAL LESS THAN 160      Adjustment disorder with anxiety     Migraine     Lumbago     Pain in thoracic spine     Hyperprolactinemia     Anxiety     Abnormal uterine bleeding (AUB)     Irregular periods     Nausea     Thickened endometrium     Thyroid nodule     Varicella     Contraception management       Past Surgical History:   Procedure Laterality Date     NO HISTORY OF SURGERY       WISDOM TOOTH EXTRACTION         Family History   Problem Relation Age of Onset     Hypertension Father      Heart Disease Father      Coronary Artery Disease Father      Family History Negative Mother      Hyperlipidemia Mother      Skin Cancer Mother      Skin Cancer Maternal Grandmother      Arthritis Maternal Grandmother      Thyroid Disease Maternal Grandmother      Heart Disease Paternal Grandmother      Diabetes Other         paternal cousin     Diabetes Cousin      Diabetes Cousin      Hypertension Other        Reviewed past medical, surgical, and family history with patient found on new patient intake packet located in EMR Media tab.     SOCIAL HX: neg to all    Oswestry (JACQUES) Questionnaire:        2/2/2025     8:29 PM   OSWESTRY DISABILITY INDEX   Count 10    Sum 6    Oswestry Score (%) 12 %        Patient-reported       Neck Disability Index:      2/2/2025     8:32 PM   Neck Disability Index (  Venkat MAURER. and [employer]. 1991. All rights reserved.; used with permission)   SECTION 1 - PAIN INTENSITY 1   SECTION 2 - PERSONAL CARE 0   SECTION 3 - LIFTING 3   SECTION 4 - READING 1   SECTION 5 - HEADACHES 2   SECTION 6 - CONCENTRATION 1   SECTION 7 - WORK 1   SECTION 8 - DRIVING 1   SECTION 9 - SLEEPING 0   SECTION 10 - RECREATION 1   Count 10    Sum 11    Raw Score: /50 11    Neck Disability Index Score: (%) 22 %        Patient-reported          PHQ-2 Score:       1/10/2025    11:11 AM 8/2/2024     9:02 AM   PHQ-2 ( 1999 [employer])   Q1: Little interest or pleasure in doing things 0 0   Q2: Feeling down, depressed or hopeless 0 0   PHQ-2 Score 0  0   Q1:  "Little interest or pleasure in doing things Not at all Not at all   Q2: Feeling down, depressed or hopeless Not at all Not at all   PHQ-2 Score 0 0       Patient-reported          ROS: positive for headache, muscle pain, anxiety. Specifically negative for bowel/bladder dysfunction, balance changes, headache, dizziness, foot drop, fevers, chills, appetite changes, nausea/vomiting, unexplained weight loss. Otherwise 13 systems reviewed are negative. Please see the patient's intake questionnaire from today for details.    OBJECTIVE:  /76   Pulse 63   Ht 5' 8\" (1.727 m)   Wt 163 lb (73.9 kg)   LMP 12/27/2024 (Exact Date)   BMI 24.78 kg/m      PHYSICAL EXAMINATION:  --CONSTITUTIONAL: Vital signs as above. No acute distress. The patient is well nourished and well groomed.  --PSYCHIATRIC: The patient is awake, alert, oriented to person, place, and time, and answering questions appropriately with clear speech. Appropriate mood and affect   --HEENT: Sclera are non-injected. Extraocular muscles are intact. Moist oral mucosa.  --SKIN: Skin over the face, bilateral upper extremities, and posterior torso is clean, dry, intact without rashes.  --RESPIRATORY: Normal rhythm and effort. No abnormal accessory muscle breathing patterns noted.     --NEUROLOGIC: CN III-XII are grossly intact.   --GROSS MOTOR: Easily arises from a seated position. Toe walking, heel walking, and tandem gait are normal.      --UPPER EXTREMITY MOTOR TESTING:  Shoulder abduction: right 5/5, left 5/5  Triceps: right 5/5 left 5/5  Biceps:right 5/5  left 5/5,   Hand :  right 5/5  left 5/5,   Intrinsics: right 5/5  left 5/5,   Extensors: right 5/5  left 5/5,     --LOWER EXTREMITY MOTOR TESTING  Hip flexion: right 5/5  left 5/5,   Quads:right 5/5  left 5/5,   Hamstrings: right 5/5  left 5/5,   Dorsiflexion: right 5/5  left 5/5,   Plantarflexion: right 5/5  left 5/5,   EHL: right 5/5  left 5/5,     REFLEXES: 2/4 symmetric triceps, biceps, " brachioradialis bilaterally. 2/4 symmetric patellar, achilles reflex bilaterally.  Negative Clonus, Babinski, and Kimble's bilaterally.      SENSATION: of the upper and lower extremities is intact to light touch.     --VASCULAR: Warm upper and lower limbs bilaterally. No swelling or color change noted.    --CERVICAL SPINE: Inspection reveals no evidence of deformity or swelling. Range of motion is not limited in cervical flexion, extension, rotation, head tilt. No point tenderness to palpation of cervical spine. No tenderness to palpation of traps, scaps, or paraspinal musculature.    --SHOULDERS: Full range of motion bilaterally: abduction, flexion, cross chest movement internal/external rotation. No tenderness to palpation or swelling noted of AC joint.        RESULTS:   Prior medical records from [facility] and Care Everywhere were reviewed today.         IMAGING:  Spine imaging was personally reviewed and interpreted today. The images were shown to the patient and the findings were explained using a spine model.    EXAM: XR CERVICAL SPINE 2/3 VIEWS  LOCATION: [facility]  DATE: 10/21/2024     INDICATION: Worsening right sided neck pain; chronic neck pain  COMPARISON:  Cervical spine radiographs 6/13/2019.                                                                      IMPRESSION: No fracture. Reversal of the usual cervical lordosis. No spondylolisthesis. Disc heights maintained. No significant facet arthropathy.    [facility]  XR CERVICAL SPINE FLEXION EXTENSION 2 - 3 VWS  6/13/2019 8:21 PM     INDICATION: Cervicalgia. Assess stability.  COMPARISON: MRI cervical spine 1/19/2015.     FINDINGS: Straightening of the normal cervical lordosis in the neutral position. No abnormal subluxation with either flexion or extension. Craniocervical junction is intact. Anterior C1-C2 articulation is normal in appearance. Vertebral body heights and      intervertebral disc space heights  are preserved. Facets appear unremarkable. No prevertebral soft tissue swelling.          This note was dictated using voice recognition software. Any grammatical or context distortions are unintentional and inherent to the software.       Helen BRADY-C  [facility]  O. [phone]      Again, thank you for allowing me to participate in the care of your patient.        Sincerely,        JOB Murphy CNP    Electronically signed

## 2025-02-03 NOTE — PATIENT INSTRUCTIONS
Importance of specialized Physical Therapy:     Today, we discussed the importance of core strengthening, ROM, and stretching exercises, and how each of these are key in decreasing pain.   The purpose of physical therapy is to teach patients a home exercise program individualized to them and their specific health concerns.  These exercises need to be performed every day in order to decrease pain and prevent future occurrences of pain.        Robaxin (methocarbamol) 500mg-1000mg four times daily as needed would be the muscle relaxer I would choose for flare up pain to trial If needed    You can continue taking aleve (naproxen) and tylenol over the counter as well  Lidocaine patches 1% over the counter can help provide pain relief as well. You can use 1-2 patches max at a time. Place for 12hrs on, 12hrs off.   Ice, heat, massage, acupuncture and dry needling are all recommended for muscular pain as needed      We can consider a new cervical MRI without contrast for treatment planning if neck symptoms plateau or worsen.      ~Please call our [facility] Nurse Navigation line [phone] with any questions or concerns about your treatment plan, if symptoms worsen and you would like to be seen urgently, or if you have any new or worsening numbness, weakness, or problems controlling bladder and bowel function.  ~You are also welcome to contact Helen Hicks via [employer], but please be aware that responses to [employer] message may take 2-3 days due to the high volume of patients seen in clinic.

## 2025-02-03 NOTE — PROGRESS NOTES
ASSESSMENT: Nguyen Jacob is a 34 year old female presents for consultation at the request of PCP Rochelle Farias, who presents today for new patient evaluation of :     -neck pain    Patient is neurologically intact on exam. No myelopathic or red flag symptoms.   We reviewed Nguyen's XR from 2019 and compared them to 2024. It looks like her alignment is overall improving, from a more forward posture in 2019 to an upright posture. There is slight reversal of normal cervical lordosis upper cervical region, which could be dt muscle spasm. I recommended ongoing PT to work on continue neck strengthening. We talked about the role of occipital nerve blocks if flare of pain with occipital tenderness on exam.  Would send robaxin rx if needed for musuclar pain for flares - does decline today. Continue naproxen as needed as well  Let me know if symptoms plateau and would consider a new cervical MRI scan for treatment planning. Follow up PRN           2/2/2025     8:29 PM   OSWESTRY DISABILITY INDEX   Count 10    Sum 6    Oswestry Score (%) 12 %        Patient-reported            Diagnoses and all orders for this visit:  Neck pain  -     Spine  Referral       PLAN:  Reviewed spine anatomy and disease process. Discussed diagnosis and treatment options with the patient today. A shared decision making model was used. The patient's values and choices were respected. The following represents what was discussed and decided upon by the provider and the patient.     -DIAGNOSTIC TESTS:  Images were personally reviewed and interpreted and explained to patient today using spine model.   --none new    -PHYSICAL THERAPY:    -continue PT  Discussed the importance of core strengthening, ROM, stretching exercises with the patient and how each of these entities is important in decreasing pain.  Explained to the patient that the purpose of physical therapy is to teach the patient a home exercise program.  These exercises need to be  performed every day in order to decrease pain and prevent future occurrences of pain.    -MEDICATIONS:    Discussed multiple medication options today with patient. Did not wish to pursue today    -INTERVENTIONS:    -Did not discuss the role of injections with patient today. Patient would be a good candidate in the future for ONB, epidural steroid injections or medial branch blocks if indicated based on symptoms and supported by MRI imaging results.    -PATIENT EDUCATION: Total time of 40 minutes, on the day of service, spent with the patient, reviewing the chart, placing orders, and documenting.   -Today we also discussed the issues related to the pros and cons of the current treatment plan.    -FOLLOW-UP:  prn    Advised patient to call the [facility] if symptoms worsen or if they develop red flag symptoms such as numbness, weakness, severe pain uncontrolled by current pain med regimen, or any new or worsening problems controlling bladder and bowel function.   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 Pain level varies. 1/10 today, 6 at worst, 0 at best. Suboccipital areas with tenderness. If aggravated it extends over the ears. Feels dull. Consistent enough. It flares up with lifting and using the wrong muscles for example (wont realize it at the time), sitting in the car for a long time. Feels like she needs to lie down once arriing after a long car trip. Standing is better, but if she has to stand up straight posture for a wedding for example, it feels worse.  No arm pain, numbness, weakness or trouble with dexterity. Very occasionally will notice a little imbalance but not  consistent.   Stable trouble opening jars.    Frontal areas  with intermittent dizziness, phonophobia, photophobia.  Vertigo for now is stable, but was flaring up around xmas time while she had an illness. She was treated with antibiotics recently 1/16/25.    Previously seen Dr Bell in 2019 - was referred for MRA head/neck, MR brain, xr cervical spine flex ex. [facility]. Last cspine MRI 2015    She is currently in PT and feels this is really working well. They are doing dry needling with stimulation. She is very satisfied with her current physical therapist. She was previously in PT and was told there was nothing more they could offer, and switched to a different provider.    She had side effects with baclofen and tizanidine and was not able to tell really if they were helping with the pain.     She uses nurtec (for migraines) and naproxen and tylenol with some relief.     She is not positive if she tried the elavil. She has tried flexeril in the past    She would prefer to avoid medications and any surgery     -Treatment to Date:     -Medications:    Current Outpatient Medications   Medication Sig Dispense Refill    acetaminophen (TYLENOL) 325 MG tablet Take 325-650 mg by mouth      Cholecalciferol (VITAMIN D-3 PO) Vitamin D3      ibuprofen (ADVIL/MOTRIN) 200 MG capsule Take 200 mg by mouth every 4 hours as needed for fever      Multiple Vitamin (MULTIVITAMIN PO) Multivitamin      naproxen sodium (ANAPROX) 550 MG tablet       NURTEC 75 MG ODT tablet        No current facility-administered medications for this visit.       No Known Allergies    Past Medical History:   Diagnosis Date    Acne     Anemia     Anxiety     NO ACTIVE PROBLEMS     Thyroid nodule 2/4/2015        Patient Active Problem List   Diagnosis    CARDIOVASCULAR SCREENING; LDL GOAL LESS THAN 160    Adjustment disorder with anxiety    Migraine    Lumbago    Pain in thoracic spine    Hyperprolactinemia    Anxiety    Abnormal uterine bleeding (AUB)     Irregular periods    Nausea    Thickened endometrium    Thyroid nodule    Varicella    Contraception management       Past Surgical History:   Procedure Laterality Date    NO HISTORY OF SURGERY      WISDOM TOOTH EXTRACTION         Family History   Problem Relation Age of Onset    Hypertension Father     Heart Disease Father     Coronary Artery Disease Father     Family History Negative Mother     Hyperlipidemia Mother     Skin Cancer Mother     Skin Cancer Maternal Grandmother     Arthritis Maternal Grandmother     Thyroid Disease Maternal Grandmother     Heart Disease Paternal Grandmother     Diabetes Other         paternal cousin    Diabetes Cousin     Diabetes Cousin     Hypertension Other        Reviewed past medical, surgical, and family history with patient found on new patient intake packet located in EMR Media tab.     SOCIAL HX: neg to all    Oswestry (JACQUES) Questionnaire:        2/2/2025     8:29 PM   OSWESTRY DISABILITY INDEX   Count 10    Sum 6    Oswestry Score (%) 12 %        Patient-reported       Neck Disability Index:      2/2/2025     8:32 PM   Neck Disability Index (  Venkat MAURER. and Libby FISCHER. 1991. All rights reserved.; used with permission)   SECTION 1 - PAIN INTENSITY 1   SECTION 2 - PERSONAL CARE 0   SECTION 3 - LIFTING 3   SECTION 4 - READING 1   SECTION 5 - HEADACHES 2   SECTION 6 - CONCENTRATION 1   SECTION 7 - WORK 1   SECTION 8 - DRIVING 1   SECTION 9 - SLEEPING 0   SECTION 10 - RECREATION 1   Count 10    Sum 11    Raw Score: /50 11    Neck Disability Index Score: (%) 22 %        Patient-reported          PHQ-2 Score:       1/10/2025    11:11 AM 8/2/2024     9:02 AM   PHQ-2 ( 1999 [employer])   Q1: Little interest or pleasure in doing things 0 0   Q2: Feeling down, depressed or hopeless 0 0   PHQ-2 Score 0  0   Q1: Little interest or pleasure in doing things Not at all Not at all   Q2: Feeling down, depressed or hopeless Not at all Not at all   PHQ-2 Score 0 0       Patient-reported       "    ROS: positive for headache, muscle pain, anxiety. Specifically negative for bowel/bladder dysfunction, balance changes, headache, dizziness, foot drop, fevers, chills, appetite changes, nausea/vomiting, unexplained weight loss. Otherwise 13 systems reviewed are negative. Please see the patient's intake questionnaire from today for details.    OBJECTIVE:  /76   Pulse 63   Ht 5' 8\" (1.727 m)   Wt 163 lb (73.9 kg)   LMP 12/27/2024 (Exact Date)   BMI 24.78 kg/m      PHYSICAL EXAMINATION:  --CONSTITUTIONAL: Vital signs as above. No acute distress. The patient is well nourished and well groomed.  --PSYCHIATRIC: The patient is awake, alert, oriented to person, place, and time, and answering questions appropriately with clear speech. Appropriate mood and affect   --HEENT: Sclera are non-injected. Extraocular muscles are intact. Moist oral mucosa.  --SKIN: Skin over the face, bilateral upper extremities, and posterior torso is clean, dry, intact without rashes.  --RESPIRATORY: Normal rhythm and effort. No abnormal accessory muscle breathing patterns noted.     --NEUROLOGIC: CN III-XII are grossly intact.   --GROSS MOTOR: Easily arises from a seated position. Toe walking, heel walking, and tandem gait are normal.      --UPPER EXTREMITY MOTOR TESTING:  Shoulder abduction: right 5/5, left 5/5  Triceps: right 5/5 left 5/5  Biceps:right 5/5  left 5/5,   Hand :  right 5/5  left 5/5,   Intrinsics: right 5/5  left 5/5,   Extensors: right 5/5  left 5/5,     --LOWER EXTREMITY MOTOR TESTING  Hip flexion: right 5/5  left 5/5,   Quads:right 5/5  left 5/5,   Hamstrings: right 5/5  left 5/5,   Dorsiflexion: right 5/5  left 5/5,   Plantarflexion: right 5/5  left 5/5,   EHL: right 5/5  left 5/5,     REFLEXES: 2/4 symmetric triceps, biceps, brachioradialis bilaterally. 2/4 symmetric patellar, achilles reflex bilaterally.  Negative Clonus, Babinski, and Kimble's bilaterally.      SENSATION: of the upper and lower " extremities is intact to light touch.     --VASCULAR: Warm upper and lower limbs bilaterally. No swelling or color change noted.    --CERVICAL SPINE: Inspection reveals no evidence of deformity or swelling. Range of motion is not limited in cervical flexion, extension, rotation, head tilt. No point tenderness to palpation of cervical spine. No tenderness to palpation of traps, scaps, or paraspinal musculature.    --SHOULDERS: Full range of motion bilaterally: abduction, flexion, cross chest movement internal/external rotation. No tenderness to palpation or swelling noted of AC joint.        RESULTS:   Prior medical [facility] from [facility] were reviewed today.         IMAGING:  Spine imaging was personally reviewed and interpreted today. The images were shown to the patient and the findings were explained using a spine model.    EXAM: XR CERVICAL SPINE 2/3 VIEWS  LOCATION: [facility]  DATE: 10/21/2024     INDICATION: Worsening right sided neck pain; chronic neck pain  COMPARISON:  Cervical spine radiographs 6/13/2019.                                                                      IMPRESSION: No fracture. Reversal of the usual cervical lordosis. No spondylolisthesis. Disc heights maintained. No significant facet arthropathy.    [facility]  XR CERVICAL SPINE FLEXION EXTENSION 2 - 3 VWS  6/13/2019 8:21 PM     INDICATION: Cervicalgia. Assess stability.  COMPARISON: MRI cervical spine 1/19/2015.     FINDINGS: Straightening of the normal cervical lordosis in the neutral position. No abnormal subluxation with either flexion or extension. Craniocervical junction is intact. Anterior C1-C2 articulation is normal in appearance. Vertebral body heights and      intervertebral disc space heights are preserved. Facets appear unremarkable. No prevertebral soft tissue swelling.          This note was dictated using voice recognition software. Any grammatical or context  distortions are unintentional and inherent to the software.       Helen CASTILLOP-C  [facility]  O. [phone]

## 2025-03-06 ENCOUNTER — TRANSFERRED RECORDS (OUTPATIENT)
Dept: HEALTH INFORMATION MANAGEMENT | Facility: CLINIC | Age: 35
End: 2025-03-06
Payer: COMMERCIAL

## 2025-06-19 ENCOUNTER — RESULTS FOLLOW-UP (OUTPATIENT)
Dept: FAMILY MEDICINE | Facility: CLINIC | Age: 35
End: 2025-06-19

## 2025-06-19 ENCOUNTER — OFFICE VISIT (OUTPATIENT)
Dept: FAMILY MEDICINE | Facility: CLINIC | Age: 35
End: 2025-06-19
Payer: COMMERCIAL

## 2025-06-19 VITALS
OXYGEN SATURATION: 98 % | DIASTOLIC BLOOD PRESSURE: 79 MMHG | TEMPERATURE: 97.9 F | SYSTOLIC BLOOD PRESSURE: 121 MMHG | HEIGHT: 68 IN | BODY MASS INDEX: 25.76 KG/M2 | HEART RATE: 70 BPM | WEIGHT: 170 LBS | RESPIRATION RATE: 22 BRPM

## 2025-06-19 DIAGNOSIS — R10.11 ABDOMINAL PAIN, RIGHT UPPER QUADRANT: Primary | ICD-10-CM

## 2025-06-19 LAB
ALBUMIN UR-MCNC: NEGATIVE MG/DL
APPEARANCE UR: CLEAR
BILIRUB UR QL STRIP: NEGATIVE
COLOR UR AUTO: YELLOW
ERYTHROCYTE [DISTWIDTH] IN BLOOD BY AUTOMATED COUNT: 12.8 % (ref 10–15)
GLUCOSE UR STRIP-MCNC: NEGATIVE MG/DL
HCT VFR BLD AUTO: 37.6 % (ref 35–47)
HGB BLD-MCNC: 12.2 G/DL (ref 11.7–15.7)
HGB UR QL STRIP: NEGATIVE
KETONES UR STRIP-MCNC: NEGATIVE MG/DL
LEUKOCYTE ESTERASE UR QL STRIP: NEGATIVE
MCH RBC QN AUTO: 30.8 PG (ref 26.5–33)
MCHC RBC AUTO-ENTMCNC: 32.4 G/DL (ref 31.5–36.5)
MCV RBC AUTO: 95 FL (ref 78–100)
NITRATE UR QL: NEGATIVE
PH UR STRIP: 5.5 [PH] (ref 5–8)
PLATELET # BLD AUTO: 227 10E3/UL (ref 150–450)
RBC # BLD AUTO: 3.96 10E6/UL (ref 3.8–5.2)
SP GR UR STRIP: 1.02 (ref 1–1.03)
UROBILINOGEN UR STRIP-ACNC: 0.2 E.U./DL
WBC # BLD AUTO: 7.6 10E3/UL (ref 4–11)

## 2025-06-19 ASSESSMENT — PAIN SCALES - GENERAL: PAINLEVEL_OUTOF10: NO PAIN (0)

## 2025-06-19 NOTE — PROGRESS NOTES
Assessment and Plan:     Abdominal pain, right upper quadrant  Differentials includes GERD, gastritis, pancreatitis, hepatobiliary pathology, appendicitis, epiploic appendagitis or mesenteric adenitis, UTI, ureterolithiasis.  Will check hemogram, CMP, lipase, urinalysis.  Will obtain abdominal ultrasound to rule out cholelithiasis.  Further plans pending the results.  If labs and ultrasound are normal, will refer to gastroenterology for further evaluation.  Discussed symptomatic treatment with over-the-counter acetaminophen or ibuprofen with food as needed for pain.  She is content with the plan.  - CBC with platelets  - Comprehensive metabolic panel  - UA Macroscopic with reflex to Microscopic and Culture  - Lipase  - US Abdomen Limited        Subjective:     Nguyen is a 35 year old female presenting to the clinic for concerns for right upper quadrant abdominal pain.  Patient has had discomfort intermittently over the past year.  She feels as though her rib cage is rubbing on something.  She has seen physical therapy who told her that her pelvis was tilted.  She is on a wait list to see a specialist for possible endometriosis.  She has a history of receiving infertility treatment.  She has discomfort and a tight sensation within her right upper abdomen.  It is worse when she has her menstrual period or when she is ovulating.  She denies nausea, vomiting, constipation, diarrhea.  She has not had blood or mucus in the stool.  Her last menstrual period was on 5/29/2025.  Periods are regular.  She denies dysuria, hematuria, low back pain, fever.  She has tried over-the-counter naproxen.  Consumption of food does not improve or exacerbate the pain.      Review of Systems: A complete 14 point review of systems was obtained and is negative or as stated in the history of present illness.    Social History     Socioeconomic History    Marital status:      Spouse name: Not on file    Number of children: Not on file     Years of education: Not on file    Highest education level: Not on file   Occupational History    Not on file   Tobacco Use    Smoking status: Never     Passive exposure: Never    Smokeless tobacco: Never   Vaping Use    Vaping status: Never Used   Substance and Sexual Activity    Alcohol use: Not Currently     Comment: Alcoholic Drinks/day: rare    Drug use: No    Sexual activity: Yes     Partners: Male     Comment:    Other Topics Concern    Parent/sibling w/ CABG, MI or angioplasty before 65F 55M? No   Social History Narrative    Not on file     Social Drivers of Health     Financial Resource Strain: Low Risk  (1/28/2025)    Financial Resource Strain     Within the past 12 months, have you or your family members you live with been unable to get utilities (heat, electricity) when it was really needed?: No   Food Insecurity: Low Risk  (1/28/2025)    Food Insecurity     Within the past 12 months, did you worry that your food would run out before you got money to buy more?: No     Within the past 12 months, did the food you bought just not last and you didn t have money to get more?: No   Transportation Needs: Low Risk  (1/28/2025)    Transportation Needs     Within the past 12 months, has lack of transportation kept you from medical appointments, getting your medicines, non-medical meetings or appointments, work, or from getting things that you need?: No   Physical Activity: Insufficiently Active (1/28/2025)    Exercise Vital Sign     Days of Exercise per Week: 4 days     Minutes of Exercise per Session: 30 min   Stress: No Stress Concern Present (1/28/2025)    [employer] [place] [employer] - Occupational Stress Questionnaire     Feeling of Stress : Not at all   Social Connections: Unknown (1/28/2025)    Social Connection and Isolation Panel [NHANES]     Frequency of Communication with Friends and Family: Not on file     Frequency of Social Gatherings with Friends and Family: Twice a week      Attends Mu-ism Services: Not on file     Active Member of Clubs or Organizations: Not on file     Attends Club or Organization Meetings: Not on file     Marital Status: Not on file   Interpersonal Safety: Low Risk  (6/19/2025)    Interpersonal Safety     Do you feel physically and emotionally safe where you currently live?: Yes     Within the past 12 months, have you been hit, slapped, kicked or otherwise physically hurt by someone?: No     Within the past 12 months, have you been humiliated or emotionally abused in other ways by your partner or ex-partner?: No   Housing Stability: Low Risk  (1/28/2025)    Housing Stability     Do you have housing? : Yes     Are you worried about losing your housing?: No       Active Ambulatory Problems     Diagnosis Date Noted    CARDIOVASCULAR SCREENING; LDL GOAL LESS THAN 160 10/31/2010    Adjustment disorder with anxiety 12/31/2012    Migraine 12/31/2012    Lumbago 01/05/2013    Pain in thoracic spine 01/05/2013    Hyperprolactinemia 01/21/2019    Anxiety 12/09/2021    Abnormal uterine bleeding (AUB) 08/01/2024    Irregular periods 08/01/2024    Nausea 01/10/2019    Thickened endometrium 08/01/2024    Thyroid nodule 02/04/2015    Varicella 05/23/2007    Contraception management 02/03/2015     Resolved Ambulatory Problems     Diagnosis Date Noted    Dizziness 01/10/2019    Oligomenorrhea 08/01/2024    Routine physical examination 02/03/2015     Past Medical History:   Diagnosis Date    Acne     Anemia     NO ACTIVE PROBLEMS        Family History   Problem Relation Age of Onset    Hypertension Father     Heart Disease Father     Coronary Artery Disease Father     Family History Negative Mother     Hyperlipidemia Mother     Skin Cancer Mother     Skin Cancer Maternal Grandmother     Arthritis Maternal Grandmother     Thyroid Disease Maternal Grandmother     Heart Disease Paternal Grandmother     Diabetes Other         paternal cousin    Diabetes Cousin     Diabetes Cousin      "Hypertension Other        Objective:     /79   Pulse 70   Temp 97.9  F (36.6  C)   Resp 22   Ht 1.727 m (5' 8\")   Wt 77.1 kg (170 lb)   LMP 05/29/2025   SpO2 98%   Breastfeeding No   BMI 25.85 kg/m      Patient is alert, in no obvious distress.   Skin: Warm, dry.  No lesions or rashes.  Skin turgor rapid return.   HEENT:  Head normocephalic, atraumatic.  Eyes normal.  Ears normal.  Nose patent, mucosa pink.  Oropharynx mucosa pink.  No lesions or tonsillar enlargement.   Neck: Supple, no lymphadenopathy.   Lungs:  Clear to auscultation. Respirations even and unlabored.  No wheezing or rales noted.   Heart:  Regular rate and rhythm.  No murmurs, S3, S4, gallops, or rubs.    Abdomen: Soft, nontender.  No organomegaly. Bowel sounds normoactive. No guarding or masses noted.   CVA tenderness is negative bilaterally.        Answers submitted by the patient for this visit:  General Questionnaire (Submitted on 6/18/2025)  Chief Complaint: Chronic problems general questions HPI Form  How many days per week do you miss taking your medication?: 0  General Concern (Submitted on 6/18/2025)  Chief Complaint: Chronic problems general questions HPI Form  What is the reason for your visit today?: Discomfort on right side of abdomen  When did your symptoms begin?: More than a month  What are your symptoms?: Feels uncomfortable or cramped, tightness  How would you describe these symptoms?: Mild  Are your symptoms:: Staying the same  Have you had these symptoms before?: Yes  Have you tried or received treatment for these symptoms before?: No  Is there anything that makes you feel worse?: Feels more noticeable around my period  Is there anything that makes you feel better?: No  Questionnaire about: Chronic problems general questions HPI Form (Submitted on 6/18/2025)  Chief Complaint: Chronic problems general questions HPI Form    "

## 2025-06-20 ENCOUNTER — HOSPITAL ENCOUNTER (OUTPATIENT)
Dept: ULTRASOUND IMAGING | Facility: CLINIC | Age: 35
Discharge: HOME | End: 2025-06-20
Attending: NURSE PRACTITIONER | Admitting: NURSE PRACTITIONER
Payer: COMMERCIAL

## 2025-06-20 DIAGNOSIS — R10.11 ABDOMINAL PAIN, RIGHT UPPER QUADRANT: ICD-10-CM

## 2025-06-20 PROCEDURE — 76705 ECHO EXAM OF ABDOMEN: CPT

## 2025-06-25 DIAGNOSIS — R10.11 ABDOMINAL PAIN, RIGHT UPPER QUADRANT: Primary | ICD-10-CM

## 2025-07-03 ENCOUNTER — PATIENT OUTREACH (OUTPATIENT)
Dept: CARE COORDINATION | Facility: CLINIC | Age: 35
End: 2025-07-03
Payer: COMMERCIAL